# Patient Record
Sex: FEMALE | Race: WHITE | Employment: OTHER | URBAN - NONMETROPOLITAN AREA
[De-identification: names, ages, dates, MRNs, and addresses within clinical notes are randomized per-mention and may not be internally consistent; named-entity substitution may affect disease eponyms.]

---

## 2018-12-10 ENCOUNTER — HOSPITAL ENCOUNTER (OUTPATIENT)
Dept: PAIN MANAGEMENT | Age: 53
Discharge: HOME | End: 2018-12-10
Payer: COMMERCIAL

## 2018-12-10 VITALS
OXYGEN SATURATION: 94 % | BODY MASS INDEX: 44.3 KG/M2 | HEIGHT: 63 IN | TEMPERATURE: 97.7 F | SYSTOLIC BLOOD PRESSURE: 151 MMHG | HEART RATE: 89 BPM | DIASTOLIC BLOOD PRESSURE: 99 MMHG | RESPIRATION RATE: 18 BRPM | WEIGHT: 250 LBS

## 2018-12-10 DIAGNOSIS — G89.29 CHRONIC BILATERAL LOW BACK PAIN WITH BILATERAL SCIATICA: Primary | ICD-10-CM

## 2018-12-10 DIAGNOSIS — M19.90 ARTHRITIS: ICD-10-CM

## 2018-12-10 DIAGNOSIS — M54.41 CHRONIC BILATERAL LOW BACK PAIN WITH BILATERAL SCIATICA: ICD-10-CM

## 2018-12-10 DIAGNOSIS — M46.1 SACROILIITIS (HCC): ICD-10-CM

## 2018-12-10 DIAGNOSIS — M54.42 CHRONIC BILATERAL LOW BACK PAIN WITH BILATERAL SCIATICA: ICD-10-CM

## 2018-12-10 DIAGNOSIS — M54.42 CHRONIC BILATERAL LOW BACK PAIN WITH BILATERAL SCIATICA: Primary | ICD-10-CM

## 2018-12-10 DIAGNOSIS — G89.29 CHRONIC BILATERAL LOW BACK PAIN WITH BILATERAL SCIATICA: ICD-10-CM

## 2018-12-10 DIAGNOSIS — M54.41 CHRONIC BILATERAL LOW BACK PAIN WITH BILATERAL SCIATICA: Primary | ICD-10-CM

## 2018-12-10 PROCEDURE — 99204 OFFICE O/P NEW MOD 45 MIN: CPT | Performed by: NURSE PRACTITIONER

## 2018-12-10 PROCEDURE — 99204 OFFICE O/P NEW MOD 45 MIN: CPT

## 2018-12-10 RX ORDER — LISINOPRIL 5 MG/1
1 TABLET ORAL DAILY
Refills: 5 | COMMUNITY
Start: 2018-11-21 | End: 2021-02-22

## 2018-12-10 RX ORDER — TIZANIDINE 4 MG/1
TABLET ORAL
Qty: 60 TABLET | Refills: 1 | Status: SHIPPED | OUTPATIENT
Start: 2018-12-10 | End: 2020-05-26 | Stop reason: SDUPTHER

## 2018-12-10 RX ORDER — ESCITALOPRAM OXALATE 20 MG/1
1 TABLET ORAL DAILY
Refills: 5 | COMMUNITY
Start: 2018-11-21

## 2018-12-10 RX ORDER — FOLIC ACID 1 MG/1
1 TABLET ORAL DAILY
Refills: 0 | COMMUNITY
Start: 2018-11-21

## 2018-12-10 RX ORDER — ETANERCEPT 50 MG/ML
50 SOLUTION SUBCUTANEOUS WEEKLY
Refills: 0 | COMMUNITY
Start: 2018-11-21

## 2018-12-10 RX ORDER — PHENTERMINE HYDROCHLORIDE 37.5 MG/1
0.5 TABLET ORAL DAILY
Refills: 0 | COMMUNITY
Start: 2018-11-21 | End: 2019-11-19

## 2018-12-10 RX ORDER — HYDROCODONE BITARTRATE AND ACETAMINOPHEN 7.5; 325 MG/1; MG/1
1 TABLET ORAL EVERY 8 HOURS PRN
Qty: 90 TABLET | Refills: 0 | Status: SHIPPED | OUTPATIENT
Start: 2018-12-10 | End: 2019-01-07 | Stop reason: SDUPTHER

## 2018-12-10 RX ORDER — COVID-19 ANTIGEN TEST
KIT MISCELLANEOUS PRN
COMMUNITY
End: 2022-10-04

## 2018-12-10 RX ORDER — EMOLLIENT BASE
CREAM (GRAM) TOPICAL
Qty: 300 G | Refills: 5 | Status: SHIPPED | OUTPATIENT
Start: 2018-12-10 | End: 2019-02-05

## 2018-12-10 RX ORDER — OMEPRAZOLE 10 MG/1
10-20 CAPSULE, DELAYED RELEASE ORAL DAILY
COMMUNITY
End: 2020-05-26

## 2018-12-10 ASSESSMENT — PAIN DESCRIPTION - FREQUENCY: FREQUENCY: CONTINUOUS

## 2018-12-10 ASSESSMENT — PAIN DESCRIPTION - ORIENTATION: ORIENTATION: LOWER;LEFT;RIGHT

## 2018-12-10 ASSESSMENT — ENCOUNTER SYMPTOMS
BACK PAIN: 1
CONSTIPATION: 0
BOWEL INCONTINENCE: 0

## 2018-12-10 ASSESSMENT — PAIN DESCRIPTION - PAIN TYPE: TYPE: CHRONIC PAIN

## 2018-12-10 ASSESSMENT — PAIN DESCRIPTION - ONSET: ONSET: ON-GOING

## 2018-12-10 ASSESSMENT — ACTIVITIES OF DAILY LIVING (ADL): EFFECT OF PAIN ON DAILY ACTIVITIES: LIMITS ACTIVITIES

## 2018-12-10 ASSESSMENT — PAIN DESCRIPTION - PROGRESSION: CLINICAL_PROGRESSION: NOT CHANGED

## 2018-12-10 NOTE — H&P
patients care. EDU Knapp - CNP, 12/10/2018 at9:41 AM    EMR dragon/transcription disclaimer: Much of this encounter note is electronic transcription/translation of spoken language to printed tach. Electronic translation of spoken language may be erroneous, or at times, nonsensical words or phrases may be inadvertently transcribed.  Although, I have reviewed the note for such errors, some may still exist.

## 2018-12-11 ENCOUNTER — TRANSCRIBE ORDERS (OUTPATIENT)
Dept: PHYSICAL THERAPY | Facility: HOSPITAL | Age: 53
End: 2018-12-11

## 2018-12-11 DIAGNOSIS — M54.41 ACUTE BACK PAIN WITH SCIATICA, RIGHT: ICD-10-CM

## 2018-12-11 DIAGNOSIS — M54.12 BRACHIAL NEURITIS: Primary | ICD-10-CM

## 2018-12-19 ENCOUNTER — HOSPITAL ENCOUNTER (OUTPATIENT)
Dept: PHYSICAL THERAPY | Facility: HOSPITAL | Age: 53
Setting detail: THERAPIES SERIES
Discharge: HOME | End: 2018-12-19

## 2018-12-19 DIAGNOSIS — M54.41 CHRONIC BILATERAL LOW BACK PAIN WITH BILATERAL SCIATICA: Primary | ICD-10-CM

## 2018-12-19 DIAGNOSIS — G89.29 CHRONIC BILATERAL LOW BACK PAIN WITH BILATERAL SCIATICA: Primary | ICD-10-CM

## 2018-12-19 DIAGNOSIS — M54.42 CHRONIC BILATERAL LOW BACK PAIN WITH BILATERAL SCIATICA: Primary | ICD-10-CM

## 2018-12-19 PROCEDURE — 97140 MANUAL THERAPY 1/> REGIONS: CPT | Performed by: PHYSICAL THERAPIST

## 2018-12-19 PROCEDURE — 97162 PT EVAL MOD COMPLEX 30 MIN: CPT | Performed by: PHYSICAL THERAPIST

## 2018-12-20 NOTE — THERAPY EVALUATION
Outpatient Physical Therapy Ortho Initial Evaluation  [facility]     Patient Name: Emiliana Carlos  : 1965  MRN: [id number]  Today's Date: 2018      Visit Date: 2018  Attendance:  (med necessity)  Subjective Improvement: n/a  Next MD Appt: 19  Recert Date: 19    Therapy Diagnosis: LBP/L SI dysfunction          Past Medical History:   Diagnosis Date   • Acute bronchitis    • Backache    • Breast cyst    • Breast lump     left breast 4 mm mass 4 oclock benign on bx    • Chest pain    • Foot pain    • Gastroesophageal reflux disease    • Generalized anxiety disorder    • History of ectopic pregnancy    • History of measles    • History of polycystic ovaries    • History of varicella    • Inflammation of foot joint    • Muscle strain     Strain of muscle of trunk   • Neck pain    • Osteoarthritis of foot joint    • Otitis media    • Pain in limb    • Peroneal tendinitis    • Plantar fasciitis    • Tendinitis of ankle     Tendinitis AND/OR tenosynovitis of the ankle region - EDL   • Tobacco dependence syndrome    • Upper respiratory infection         Past Surgical History:   Procedure Laterality Date   • BREAST BIOPSY     • BREAST SURGERY  2013    BX BREAST PERCUT W/IMAGE 88542 (1) - Left breast ultrasound guided mammotome biopsy with 8 gauge needle and left a clip   • DENTAL PROCEDURE      [place] teeth extraction   • DIAGNOSTIC LAPAROSCOPY  1994    With exploratory laparotomy and right uterine cornual resection.   • HERNIA REPAIR  2008    Ventral hernia repair with mesh   • OTHER SURGICAL HISTORY  2005    PHLEB VEINS - EXTREM - (TO 83) 76213 (1) -  High ligation and complete stripping of two separate saphenous systems of the right lower extermity. Multiple puncture phlebectomies of varicose veins of right lower extremity   • OTHER SURGICAL HISTORY  2015    WALKING/AMBULATORY SHORTLEG CAST 93863 (2)   • PAP SMEAR  2010    Negative   • TOTAL  "ABDOMINAL HYSTERECTOMY WITH SALPINGO OOPHORECTOMY  11/13/2006    With right eqtcqlyh-[id number]-wqshdupsewf       Current Outpatient Medications:   •  conjugated estrogens (PREMARIN) 0.625 MG/GM vaginal cream, Insert  into the vagina daily. Insert 0.5 g twice weekly, Disp: 30 g, Rfl: 2  •  escitalopram (LEXAPRO) 10 MG tablet, Take 10 mg by mouth daily., Disp: , Rfl:   •  Etanercept (ENBREL) 25 MG reconstituted solution, Inject  under the skin., Disp: , Rfl:   •  folic acid (FOLVITE) 1 MG tablet, Take 1 mg by mouth daily., Disp: , Rfl:   •  gabapentin (NEURONTIN) 100 MG capsule, Take 100 mg by mouth 3 (three) times a day., Disp: , Rfl:   •  HYDROcodone-acetaminophen (NORCO)  MG per tablet, Take 1 tablet by mouth every 6 (six) hours as needed for moderate pain (4-6)., Disp: , Rfl:   •  methotrexate 2.5 MG tablet, Take  by mouth 3 (three) doses each week. Take doses 12 (twelve) hours apart from each other., Disp: , Rfl:     Allergies   Allergen Reactions   • Codeine        Visit Dx:     ICD-10-CM ICD-9-CM   1. Chronic bilateral low back pain with bilateral sciatica M54.42 724.2    M54.41 724.3    G89.29 338.29       Patient History     Row Name 12/19/18 0900          Chief Complaint  Pain  -SS    Type of Pain  Back pain;Lower Extremity / Leg  -SS    Date Current Problem(s) Began  -- chronic  -SS    Brief Description of Current Complaint  Patient has a chronic history of low back pain that radiates typically down the posterolateral L thigh to the knee and occasionally in R LE. She thinks that some of her back pain is related to L ankle arthritis. \"Because when it flares, my back hurts worse.\" Numbness R lateral thigh. Unknown cause of onset. Increased pain with prolonged standing, lifting, repetitive bending, and sitting > 1 hour. Decreased pain with Epsom salt soaks, position changes.  female with children. Lives in a single story house with 2 steps to enter. Difficulty with stairs due to ankle and low back.  " "-SS    Patient/Caregiver Goals  Relieve pain  -SS    Current Tobacco Use  cigarette smoker  -SS    Smoking Status  1 ppd  -SS    Patient's Rating of General Health  Good  -SS    Occupation/sports/leisure activities  [employer] - , has left work early due to pain. Hobbies: grandchild  -SS    What clinical tests have you had for this problem?  -- none recent  -SS          Pain Location  Back;Leg  -SS    Pain at Present  4  -SS    Pain at Best  4 over past 1 month  -SS    Pain at Worst  6 over past 1 month  -SS    Pain Frequency  Constant/continuous  -SS    Pain Description  Aching;Sharp back \"hurts/bruise\", hip sharp  -SS    What Performance Factors Make the Current Problem(s) WORSE?  prolonged sitting, prolonged standing, lifting, repetitive bending  -SS    What Performance Factors Make the Current Problem(s) BETTER?  Epsom salt soak  -SS    Is your sleep disturbed?  Yes  -SS    Is medication used to assist with sleep?  No  -SS    Difficulties at work?  increased pain  -SS    Difficulties with ADL's?  increased pain  -SS    Difficulties with recreational activities?  increased pain  -SS          Any falls in the past year:  No  -SS    Does patient have a fear of falling  Yes (comment)  -SS          Primary Language  English  -SS          Are you being hurt, hit, or frightened by anyone at home or in your life?  No  -SS    Are you being neglected by a caregiver  No  -SS      User Key  (r) = Recorded By, (t) = Taken By, (c) = Cosigned By    Initials Name Provider Type    Travis Hernandez, PT DPT Physical Therapist          PT Ortho     Row Name 12/19/18 0900       Subjective Comments    Subjective Comments  see Therapy Patient History  -SS       Precautions and Contraindications    Precautions/Limitations  no known precautions/limitations  -SS       Subjective Pain    Able to rate subjective pain?  yes  -SS    Pre-Treatment Pain Level  4  -SS    Post-Treatment Pain Level  4  -SS       " Posture/Observations    Alignment Options  Forward head;Cervical lordosis;Thoracic kyphosis;Rounded shoulders;Lumbar lordosis  -SS    Forward Head  Moderate  -SS    Cervical Lordosis  Increased  -SS    Thoracic Kyphosis  Increased  -SS    Rounded Shoulders  Bilateral:  -SS    Lumbar lordosis  Increased  -SS    Posture/Observations Comments  Antalgic gait from ankle and low back.  -SS       Lumbar/SI Special Tests    Lumbar/SI Special Tests Comments  Anterior rotation L hemipelvis  -SS       Lumbosacral Palpation    SI  Left:;Tender  -SS    Lumbosacral Segment  Left:;Tender  -SS    Thoracolumbar Segment  Tender;Guarded/taut  -SS    Spinous Process  Tender thoracolumbar junction  -SS    Piriformis  Right:;Tender;Guarded/taut  -SS    Quadratus Lumborum  -- non-tender  -SS    Erector Spinae (Paraspinals)  Left:;Tender;Guarded/taut lower lumbars  -SS       Head/Neck/Trunk    Trunk Extension AROM  WFLs; pain thoracolumbar junction  -SS    Trunk Flexion AROM  hands to patella; pulling thoracolumbar junction and L SI  -SS    Trunk Lt Lateral Flexion AROM  fingertips to knee lateral joint line  -SS    Trunk Rt Lateral Flexion AROM  fingertips to superior patella; pain L thoracolumbar junction  -SS       MMT (Manual Muscle Testing)    Rt Lower Ext  Rt Hip Flexion;Rt Hip Extension;Rt Hip ABduction;Rt Hip ADduction;Rt Hip Internal (Medial) Rotation;Rt Hip External (Lateral) Rotation;Rt Knee Extension;Rt Knee Flexion;Rt Ankle Dorsiflexion  -SS    Lt Lower Ext  Lt Hip Flexion;Lt Hip Extension;Lt Hip ABduction;Lt Hip ADduction;Lt Hip Internal (Medial) Rotation;Lt Hip External (Lateral) Rotation;Lt Knee Flexion;Lt Knee Extension;Lt Ankle Dorsiflexion  -SS       MMT Right Lower Ext    Rt Hip Flexion MMT, Gross Movement  (5/5) normal  -SS    Rt Hip Extension MMT, Gross Movement  (4/5) good  -SS    Rt Hip ABduction MMT, Gross Movement  (5/5) normal  -SS    Rt Hip ADduction MMT, Gross Movement  (5/5) normal  -SS    Rt Hip Internal  (Medial) Rotation MMT, Gross Movement  (5/5) normal  -SS    Rt Hip External (Lateral) Rotation MMT, Gross Movement  (5/5) normal  -SS    Rt Knee Extension MMT, Gross Movement  (5/5) normal  -SS    Rt Knee Flexion MMT, Gross Movement  (5/5) normal  -SS    Rt Ankle Dorsiflexion MMT, Gross Movement  (5/5) normal  -SS       MMT Left Lower Ext    Lt Hip Flexion MMT, Gross Movement  (4+/5) good plus L SI pain  -SS    Lt Hip Extension MMT, Gross Movement  (4/5) good L hip/SI pain  -SS    Lt Hip ABduction MMT, Gross Movement  (4/5) good L hip/SI pain  -SS    Lt Hip ADduction MMT, Gross Movement  (4/5) good L hip/SI pain  -SS    Lt Hip Internal (Medial) Rotation MMT, Gross Movement  (4+/5) good plus L hip/SI pain  -SS    Lt Hip External (Lateral) Rotation MMT, Gross Movement  (4+/5) good plus L hip/SI pain  -SS    Lt Knee Extension MMT, Gross Movement  (5/5) normal L hip/SI pain  -SS    Lt Knee Flexion MMT, Gross Movement  (5/5) normal L hip/SI pain  -SS    Lt Ankle Dorsiflexion MMT, Gross Movement  (4-/5) good minus  -SS      User Key  (r) = Recorded By, (t) = Taken By, (c) = Cosigned By    Initials Name Provider Type    Travis Hernandez, PT DPT Physical Therapist                      Therapy Education  Education Details: trans ab set, glute set  Given: HEP  Program: New  How Provided: Verbal, Demonstration, Written  Provided to: Patient  Level of Understanding: Verbalized, Demonstrated     PT OP Goals     Row Name 12/19/18 0900          STG Date to Achieve  01/09/19  -    STG 1  Note a >/= 50% subjective improvement  -    STG 2  Modified Oswestry score to be </= 24%  -          LTG Date to Achieve  01/30/19  -    LTG 1  Independent with HEP  -    LTG 2  TROM WFLs with minimal to no pain  -    LTG 3  R LE MMT to be 5/5 in all planes  -    LTG 4  Report ability to complete work and household activities with tolerable level of pain  -          PT Goal Re-Cert Due Date  01/09/19  [facility]      User Key  (r)  = Recorded By, (t) = Taken By, (c) = Cosigned By    Initials Name Provider Type     Travis Escobedo, PT DPT Physical Therapist          PT Assessment/Plan     Row Name 12/19/18 0900          Functional Limitations  Limitation in home management;Limitations in community activities;Limitations in functional capacity and performance;Performance in leisure activities;Performance in work activities  -    Impairments  Range of motion;Pain;Muscle strength  -    Assessment Comments  Patient has chronic LBP with apparent SI dysfunction. Alignment was corrected by manual therapy this date. Patient reports that she feels looser after treatment but no change in pain this date.   -    Rehab Potential  Good barrier: chronicity  -    Patient/caregiver participated in establishment of treatment plan and goals  Yes  -SS    Patient would benefit from skilled therapy intervention  Yes  -SS          PT Frequency  2x/week  -    Predicted Duration of Therapy Intervention (Therapy Eval)  4-6 weeks  -    PT Plan Comments  Manual therapy, stretching, trunk and lower extremity muscle strengthening, IFC estim with heat/ice as needed for pain.  -      User Key  (r) = Recorded By, (t) = Taken By, (c) = Cosigned By    Initials Name Provider Type     Travis Escobedo, PT DPT Physical Therapist            Exercises     Row Name 12/19/18 0900          Subjective Comments  see Therapy Patient History  -          Able to rate subjective pain?  yes  -SS    Pre-Treatment Pain Level  4  -SS    Post-Treatment Pain Level  4  -SS          Exercise Name 1  Trans ab set  -SS    Cueing 1  Verbal  -SS    Sets 1  1  -SS    Reps 1  10  -SS    Time 1  5 sec  -SS          Exercise Name 2  Supine glute set  -SS    Cueing 2  Verbal  -SS    Sets 2  1  -SS    Reps 2  5  -SS    Time 2  5 sec  -SS      User Key  (r) = Recorded By, (t) = Taken By, (c) = Cosigned By    Initials Name Provider Type    Travis Hernandez, PT DPT  Physical Therapist           Manual Rx (last 36 hours)      Manual Treatments     Row Name 12/19/18 0900       Manual Rx 1    Manual Rx 1 Location  lumbar spine  -SS    Manual Rx 1 Type  joint mobilization  -SS    Manual Rx 1 Grade  4  -SS       Manual Rx 2    Manual Rx 2 Location  L hemipelvis  -SS    Manual Rx 2 Type  anterior rotation correction  -SS       Manual Rx 3    Manual Rx 3 Type  ME shotgun  -SS      User Key  (r) = Recorded By, (t) = Taken By, (c) = Cosigned By    Initials Name Provider Type     Travis Escobedo, PT DPT Physical Therapist                      Outcome Measure Options: Modified [place]  Modified Oswestry  Modified Oswestry Score/Comments: 17/50 = 54%      Time Calculation:         Start Time: 0935  Stop Time: 1015  Time Calculation (min): 40 min     Therapy Charges for Today     Code Description Service Date Service Provider Modifiers Qty    21956379500 HC PT MANUAL THERAPY EA 15 MIN 12/19/2018 Travis Escobedo, PT DPT GP 1    13611580900 HC PT EVAL MOD COMPLEXITY 2 12/19/2018 Travis Escobedo, PT DPT GP 1                   Travis Escobedo, PT, DPT, CHT  12/19/2018

## 2018-12-26 ENCOUNTER — APPOINTMENT (OUTPATIENT)
Dept: PHYSICAL THERAPY | Facility: HOSPITAL | Age: 53
End: 2018-12-26

## 2018-12-27 ENCOUNTER — HOSPITAL ENCOUNTER (OUTPATIENT)
Dept: PHYSICAL THERAPY | Facility: HOSPITAL | Age: 53
Setting detail: THERAPIES SERIES
Discharge: HOME | End: 2018-12-27

## 2018-12-27 DIAGNOSIS — M54.41 CHRONIC BILATERAL LOW BACK PAIN WITH BILATERAL SCIATICA: Primary | ICD-10-CM

## 2018-12-27 DIAGNOSIS — M54.42 CHRONIC BILATERAL LOW BACK PAIN WITH BILATERAL SCIATICA: Primary | ICD-10-CM

## 2018-12-27 DIAGNOSIS — G89.29 CHRONIC BILATERAL LOW BACK PAIN WITH BILATERAL SCIATICA: Primary | ICD-10-CM

## 2018-12-27 PROCEDURE — 97140 MANUAL THERAPY 1/> REGIONS: CPT | Performed by: PHYSICAL THERAPIST

## 2018-12-27 PROCEDURE — 97110 THERAPEUTIC EXERCISES: CPT | Performed by: PHYSICAL THERAPIST

## 2018-12-27 PROCEDURE — G0283 ELEC STIM OTHER THAN WOUND: HCPCS | Performed by: PHYSICAL THERAPIST

## 2018-12-27 NOTE — THERAPY TREATMENT NOTE
Outpatient Physical Therapy Ortho Treatment Note  [facility]     Patient Name: Emiliana Carlos  : 1965  MRN: [id number]  Today's Date: 2018      Visit Date: 2018  Attendance:  (med necessity)  Subjective Improvement: 0%  Next MD Appt: 19  Recert Date: 19     Therapy Diagnosis: LBP/L SI dysfunction      Visit Dx:    ICD-10-CM ICD-9-CM   1. Chronic bilateral low back pain with bilateral sciatica M54.42 724.2    M54.41 724.3    G89.29 338.29            Past Medical History:   Diagnosis Date   • Acute bronchitis    • Backache    • Breast cyst    • Breast lump     left breast 4 mm mass 4 oclock benign on bx    • Chest pain    • Foot pain    • Gastroesophageal reflux disease    • Generalized anxiety disorder    • History of ectopic pregnancy    • History of measles    • History of polycystic ovaries    • History of varicella    • Inflammation of foot joint    • Muscle strain     Strain of muscle of trunk   • Neck pain    • Osteoarthritis of foot joint    • Otitis media    • Pain in limb    • Peroneal tendinitis    • Plantar fasciitis    • Tendinitis of ankle     Tendinitis AND/OR tenosynovitis of the ankle region - EDL   • Tobacco dependence syndrome    • Upper respiratory infection         Past Surgical History:   Procedure Laterality Date   • BREAST BIOPSY     • BREAST SURGERY  2013    BX BREAST PERCUT W/IMAGE 69592 (1) - Left breast ultrasound guided mammotome biopsy with 8 gauge needle and left a clip   • DENTAL PROCEDURE      [place] teeth extraction   • DIAGNOSTIC LAPAROSCOPY  1994    With exploratory laparotomy and right uterine cornual resection.   • HERNIA REPAIR  2008    Ventral hernia repair with mesh   • OTHER SURGICAL HISTORY  2005    PHLEB VEINS - EXTREM - (TO 00) 95655 (1) -  High ligation and complete stripping of two separate saphenous systems of the right lower extermity. Multiple puncture phlebectomies of varicose veins of right lower  extremity   • OTHER SURGICAL HISTORY  03/22/2015    WALKING/AMBULATORY SHORTLEG CAST 60634 (2)   • PAP SMEAR  05/06/2010    Negative   • TOTAL ABDOMINAL HYSTERECTOMY WITH SALPINGO OOPHORECTOMY  11/13/2006    With right haxuxdjl-wslefj-[id number]       PT Ortho     Row Name 12/27/18 1500       Precautions and Contraindications    Precautions/Limitations  no known precautions/limitations  -       Posture/Observations    Posture/Observations Comments  Antalgic gait.  -SS       Lumbar/SI Special Tests    Lumbar/SI Special Tests Comments  Anterior rotation L. L LE long compared to R. No change in symptoms with manual distraction.   -      User Key  (r) = Recorded By, (t) = Taken By, (c) = Cosigned By    Initials Name Provider Type    Travis Hernandez, PT DPT Physical Therapist                      PT Assessment/Plan     Row Name 12/27/18 1500          Functional Limitations  Limitation in home management;Limitations in community activities;Limitations in functional capacity and performance;Performance in leisure activities;Performance in work activities  -    Impairments  Range of motion;Pain;Muscle strength  -    Assessment Comments  Patient was dispensed a small heel lift for correction of functional leg length deficit during gait. Gait more even with heel lift in place. Unable to correct alignment this date. Decreased pain with IFC estim and MHP.  -    Rehab Potential  Good barrier: chronicity  -    Patient/caregiver participated in establishment of treatment plan and goals  Yes  -SS    Patient would benefit from skilled therapy intervention  Yes  -SS          PT Frequency  2x/week  -    Predicted Duration of Therapy Intervention (Therapy Eval)  4-6 weeks  -    PT Plan Comments  Continue POC. Progress core stabilization exercises next.  -      User Key  (r) = Recorded By, (t) = Taken By, (c) = Cosigned By    Initials Name Provider Type    Travis Hernandez, PT DPT Physical Therapist           Modalities     Row Name 12/27/18 1500          MH Applied  Yes  -SS    Location  low back concurrent with IFC estim  -SS    Rx Minutes  15 mins  -SS    MH Prior to Rx  No  -SS    MH S/P Rx  Yes  -SS          Attended/Unattended  Unattended  -SS    Stimulation Type  IFC  -SS    Location/Electrode Placement/Other  low back concurrent with MHP  -SS     PT E-Stim Unattended (Manual) Minutes  15  -SS      User Key  (r) = Recorded By, (t) = Taken By, (c) = Cosigned By    Initials Name Provider Type    SS Travis Escobedo, PT DPT Physical Therapist          Exercises     Row Name 12/27/18 1500          Existing Precautions/Restrictions  no known precautions/restrictions  -SS          Subjective Comments  Felt a bit better after evaluation until the next day. 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 Denies numbness and tingling. HEP 1-2x/day. 0% improvement.  -SS          Able to rate subjective pain?  yes  -SS    Pre-Treatment Pain Level  5  -SS    Post-Treatment Pain Level  4  -SS          Exercise Name 1  manual therapy  -SS          Exercise Name 2  Prone knee flexion  -SS    Cueing 2  Verbal  -SS    Sets 2  1  -SS    Reps 2  10  -SS          Exercise Name 3  Prone glute sets  -SS    Cueing 3  Verbal  -SS    Sets 3  1  -SS    Reps 3  15  -SS    Time 3  5 sec hold  -SS          Exercise Name 4  Prone SLR extension  -SS    Cueing 4  Verbal  -SS    Sets 4  2  -SS    Reps 4  5  -SS    Time 4  2 sec hold  -SS          Exercise Name 5  SKTC L  -SS    Cueing 5  Verbal  -SS    Sets 5  3  -SS    Time 5  30 sec hold  -SS          Exercise Name 6  supine L piriformis stretch  -SS    Cueing 6  Verbal  -SS    Sets 6  3  -SS    Time 6  30 sec hold  -SS          Exercise Name 7  Seated trunk rotation   -SS    Cueing 7  Verbal;Demo  -SS    Sets 7  1  -SS    Reps 7  5  -SS    Time 7  5 sec hold  -SS          Exercise Name 8  Seated iso hip adduction  -SS    Cueing 8  Verbal;Demo  -SS    Sets  8  1  -SS    Reps 8  10  -SS    Time 8  5 sec hold  -      User Key  (r) = Recorded By, (t) = Taken By, (c) = Cosigned By    Initials Name Provider Type    Travis Hernandez, PT DPT Physical Therapist                        Manual Rx (last 36 hours)      Manual Treatments     Row Name 12/27/18 1500          Manual Rx 1 Location  L hemipelvis  -SS    Manual Rx 1 Type  anterior rotation correction  -SS          Manual Rx 2 Type  ME shotgun  -SS          Manual Rx 3 Location  lumbar spine  -SS    Manual Rx 3 Type  joint mobilization  -SS    Manual Rx 3 Grade  4  -SS          Manual Rx 4 Type  Sacral mobilization  -SS    Manual Rx 4 Grade  3  -SS      User Key  (r) = Recorded By, (t) = Taken By, (c) = Cosigned By    Initials Name Provider Type    Travis Hernandez, PT DPT Physical Therapist          PT OP Goals     Row Name 12/27/18 1500          STG Date to Achieve  01/09/19  -    STG 1  Note a >/= 50% subjective improvement  -    STG 1 Progress  Ongoing  -    STG 2  Modified Oswestry score to be </= 24%  -    STG 2 Progress  Ongoing  -          LTG Date to Achieve  01/30/19  -    LTG 1  Independent with HEP  -    LTG 1 Progress  Ongoing  -    LTG 2  TROM WFLs with minimal to no pain  -    LTG 2 Progress  Ongoing  -    LTG 3  R LE MMT to be 5/5 in all planes  -    LTG 3 Progress  Ongoing  -    LTG 4  Report ability to complete work and household activities with tolerable level of pain  -    LTG 4 Progress  Ongoing  -          PT Goal Re-Cert Due Date  01/09/19  -      User Key  (r) = Recorded By, (t) = Taken By, (c) = Cosigned By    Initials Name Provider Type    Travis Hernandez, PT DPT Physical Therapist          Therapy Education  Education Details: supine L piriformis stretch  Given: HEP  Program: Progressed  How Provided: Verbal, Written  Provided to: Patient  Level of Understanding: Verbalized, Demonstrated              Time Calculation:   Start Time:  1512  Stop Time: 1609  Time Calculation (min): 57 min    Therapy Charges for Today     Code Description Service Date Service Provider Modifiers Qty    70537841233 HC PT THER PROC EA 15 MIN 12/27/2018 Travis Escobedo, PT DPT GP 2    56536873571 HC PT MANUAL THERAPY EA 15 MIN 12/27/2018 Travis Escobedo, PT DPT GP 1    34759535640 HC PT ELECTRICAL STIM UNATTENDED 12/27/2018 Travis Escobedo, PT DPT  1    33504401750 HC PT THER SUPP EA 15 MIN 12/27/2018 Travis Escobedo, PT DPT GP 1                    Travis Escobedo, PT, DPT, CHT  12/27/2018

## 2019-01-02 ENCOUNTER — HOSPITAL ENCOUNTER (OUTPATIENT)
Dept: PHYSICAL THERAPY | Facility: HOSPITAL | Age: 54
Setting detail: THERAPIES SERIES
Discharge: HOME | End: 2019-01-02

## 2019-01-02 DIAGNOSIS — G89.29 CHRONIC BILATERAL LOW BACK PAIN WITH BILATERAL SCIATICA: Primary | ICD-10-CM

## 2019-01-02 DIAGNOSIS — M54.42 CHRONIC BILATERAL LOW BACK PAIN WITH BILATERAL SCIATICA: Primary | ICD-10-CM

## 2019-01-02 DIAGNOSIS — M54.41 CHRONIC BILATERAL LOW BACK PAIN WITH BILATERAL SCIATICA: Primary | ICD-10-CM

## 2019-01-02 PROCEDURE — G0283 ELEC STIM OTHER THAN WOUND: HCPCS

## 2019-01-02 PROCEDURE — 97110 THERAPEUTIC EXERCISES: CPT

## 2019-01-02 PROCEDURE — 97140 MANUAL THERAPY 1/> REGIONS: CPT

## 2019-01-02 NOTE — THERAPY TREATMENT NOTE
Outpatient Physical Therapy Ortho Treatment Note  [facility]     Patient Name: Emiliana Carlos  : 1965  MRN: [id number]  Today's Date: 2019      Visit Date: 2019     Subjective Improvement 0  Visits 3/4  Visits approved med nec  RTMD 2019  recert Date 2019    Low back pain and SI dysfuncton    Visit Dx:    ICD-10-CM ICD-9-CM   1. Chronic bilateral low back pain with bilateral sciatica M54.42 724.2    M54.41 724.3    G89.29 338.29       There is no problem list on file for this patient.       Past Medical History:   Diagnosis Date   • Acute bronchitis    • Backache    • Breast cyst    • Breast lump     left breast 4 mm mass 4 oclock benign on bx    • Chest pain    • Foot pain    • Gastroesophageal reflux disease    • Generalized anxiety disorder    • History of ectopic pregnancy    • History of measles    • History of polycystic ovaries    • History of varicella    • Inflammation of foot joint    • Muscle strain     Strain of muscle of trunk   • Neck pain    • Osteoarthritis of foot joint    • Otitis media    • Pain in limb    • Peroneal tendinitis    • Plantar fasciitis    • Tendinitis of ankle     Tendinitis AND/OR tenosynovitis of the ankle region - EDL   • Tobacco dependence syndrome    • Upper respiratory infection         Past Surgical History:   Procedure Laterality Date   • BREAST BIOPSY     • BREAST SURGERY  2013    BX BREAST PERCUT W/IMAGE 03560 (1) - Left breast ultrasound guided mammotome biopsy with 8 gauge needle and left a clip   • DENTAL PROCEDURE      [place] teeth extraction   • DIAGNOSTIC LAPAROSCOPY  1994    With exploratory laparotomy and right uterine cornual resection.   • HERNIA REPAIR  2008    Ventral hernia repair with mesh   • OTHER SURGICAL HISTORY  2005    PHLEB VEINS - EXTREM - (TO 87) 08896 (1) -  High ligation and complete stripping of two separate saphenous systems of the right lower extermity. Multiple puncture  phlebectomies of varicose veins of right lower extremity   • OTHER SURGICAL HISTORY  03/22/2015    WALKING/AMBULATORY SHORTLEG CAST 60187 (2)   • PAP SMEAR  05/06/2010    Negative   • TOTAL ABDOMINAL HYSTERECTOMY WITH SALPINGO OOPHORECTOMY  11/13/2006    With right byssgecx-vvulfu-ktvnquxkplf                       PT Assessment/Plan     Row Name 01/02/19 1109          PT Assessment    Assessment Comments  Attempted ME to correct ant rotation.  ME did not 100% correct rotation.  No change in pain after manual  -CP        PT Plan    PT Frequency  2x/week  -CP     Predicted Duration of Therapy Intervention (Therapy Eval)  4-6 weeks.  Patien only able to attend therapy 1x week secondary to work schedule  -CP     PT Plan Comments  Cont with POC.  Mini squats 3 way.  Contact primary PT on starting aquatics .  -CP       User Key  (r) = Recorded By, (t) = Taken By, (c) = Cosigned By    Initials Name Provider Type    CP Aarti Maurice, MARTHA Physical Therapy Assistant          Modalities     Row Name 01/02/19 1000             Moist Heat    MH Applied  Yes  -CP      Location  low back seated  -CP      Rx Minutes  -- 20 minutes with IFC  -CP      MH S/P Rx  Yes  -CP         ELECTRICAL STIMULATION    Attended/Unattended  Unattended  -CP      Stimulation Type  IFC  -CP      Location/Electrode Placement/Other  low back  -CP       PT E-Stim Unattended (Manual) Minutes  20  -CP        User Key  (r) = Recorded By, (t) = Taken By, (c) = Cosigned By    Initials Name Provider Type    CP Aarti Maurice, PTA Physical Therapy Assistant          Exercises     Row Name 01/02/19 1000             Subjective Comments    Subjective Comments  Patient states that she feels about the same.  She did some heavy house cleaning yesterday and feels stiff and sore..  States that she is wearing her heel lift at times  -CP         Subjective Pain    Able to rate subjective pain?  yes  -CP      Pre-Treatment Pain Level  5  -CP      Post-Treatment  "Pain Level  5  -CP         Exercise 1    Exercise Name 1  incline stretch  -CP      Sets 1  3  -CP      Time 1  30  -CP         Exercise 2    Exercise Name 2  Standing HS stretch  -CP      Sets 2  3  -CP      Time 2  30  -CP      Additional Comments  bilateral  -CP         Exercise 3    Exercise Name 3  seated mid back stretch with tball  -CP      Sets 3  3  -CP      Time 3  20  -CP         Exercise 4    Exercise Name 4  Pro II level 1  -CP      Time 4  10  -CP      Additional Comments  LE  -CP         Exercise 5    Exercise Name 5  dktc with tball  -CP      Reps 5  30  -CP         Exercise 6    Exercise Name 6  LTR stretch  -CP      Reps 6  30  -CP         Exercise 7    Exercise Name 7  bridges  -CP      Sets 7  2  -CP      Reps 7  10  -CP         Exercise 8    Exercise Name 8  alignment check  -CP         Exercise 9    Exercise Name 9  see manual  -CP         Exercise 10    Exercise Name 10  supine TA  -CP      Sets 10  2  -CP      Reps 10  10  -CP      Time 10  5\" hold  -CP        User Key  (r) = Recorded By, (t) = Taken By, (c) = Cosigned By    Initials Name Provider Type    CP Aarti Maurice, MARTHA Physical Therapy Assistant                        Manual Rx (last 36 hours)      Manual Treatments     Row Name 01/02/19 1100             Manual Rx 1    Manual Rx 1 Location  Left SI  -CP      Manual Rx 1 Type  ME for ant rotaton  -CP      Manual Rx 1 Duration  5  -CP         Manual Rx 2    Manual Rx 2 Type  ME shotgun negetitve  -CP         Manual Rx 3    Manual Rx 3 Location  sacral  -CP      Manual Rx 3 Type  sacral mob and rocking  -CP      Manual Rx 3 Duration  5  -CP        User Key  (r) = Recorded By, (t) = Taken By, (c) = Cosigned By    Initials Name Provider Type    CP Aarti Maurice, MARTHA Physical Therapy Assistant          PT OP Goals     Row Name 01/02/19 1100          PT Short Term Goals    STG Date to Achieve  01/09/19  -CP     STG 1  Note a >/= 50% subjective improvement  -CP     STG 1 Progress  " Ongoing  -CP     STG 2  Modified Oswestry score to be </= 24%  -CP     STG 2 Progress  Ongoing  -CP        Long Term Goals    LTG Date to Achieve  01/30/19  -CP     LTG 1  Independent with HEP  -CP     LTG 1 Progress  Ongoing  -CP     LTG 2  TROM WFLs with minimal to no pain  -CP     LTG 2 Progress  Ongoing  -CP     LTG 3  R LE MMT to be 5/5 in all planes  -CP     LTG 3 Progress  Ongoing  -CP     LTG 4  Report ability to complete work and household activities with tolerable level of pain  -CP     LTG 4 Progress  Ongoing  -CP        Time Calculation    PT Goal Re-Cert Due Date  01/09/19  -CP       User Key  (r) = Recorded By, (t) = Taken By, (c) = Cosigned By    Initials Name Provider Type    Aarti Nelson, PTA Physical Therapy Assistant                         Time Calculation:   Start Time: 1020  Stop Time: 1127  Time Calculation (min): 67 min  Total Timed Code Minutes- PT: 45 minute(s)  Therapy Suggested Charges     Code   Minutes Charges    98283 (CPT®) Hc Pt Neuromusc Re Education Ea 15 Min      19001 (CPT®) Hc Pt Ther Proc Ea 15 Min      45307 (CPT®) Hc Gait Training Ea 15 Min      33821 (CPT®) Hc Pt Therapeutic Act Ea 15 Min      95841 (CPT®) Hc Pt Manual Therapy Ea 15 Min      57628 (CPT®) Hc Pt Ther Massage- Per 15 Min      00412 (CPT®) Hc Pt Iontophoresis Ea 15 Min      40649 (CPT®) Hc Pt Elec Stim Ea-Per 15 Min      27114 (CPT®) Hc Pt Ultrasound Ea 15 Min      83807 (CPT®) Hc Pt Self Care/Mgmt/Train Ea 15 Min      51313 (CPT®) Hc Pt Prosthetic (S) Train Initial Encounter, Each 15 Min      77263 (CPT®) Hc Orthotic(S) Mgmt/Train Initial Encounter, Each 15min      48341 (CPT®) Hc Pt Aquatic Therapy Ea 15 Min      91446 (CPT®) Hc Pt Orthotic(S)/Prosthetic(S) Encounter, Each 15 Min       (CPT®) Hc Pt Electrical Stim Unattended 20 1    Total  20 1        Therapy Charges for Today     Code Description Service Date Service Provider Modifiers Qty    43803775229 HC PT MANUAL THERAPY EA 15 MIN 1/2/2019  Aarti Maurice, PTA GP 1    13357699778 HC PT THER PROC EA 15 MIN 1/2/2019 Aarti Maurice, MARTHA GP 2    37518932876 HC PT ELECTRICAL STIM UNATTENDED 1/2/2019 Aarti Maurice, PTA  1                    Aarti Maurice, MARTHA  1/2/2019

## 2019-01-07 DIAGNOSIS — G89.29 CHRONIC BILATERAL LOW BACK PAIN WITH BILATERAL SCIATICA: ICD-10-CM

## 2019-01-07 DIAGNOSIS — M54.41 CHRONIC BILATERAL LOW BACK PAIN WITH BILATERAL SCIATICA: ICD-10-CM

## 2019-01-07 DIAGNOSIS — M54.42 CHRONIC BILATERAL LOW BACK PAIN WITH BILATERAL SCIATICA: ICD-10-CM

## 2019-01-07 RX ORDER — HYDROCODONE BITARTRATE AND ACETAMINOPHEN 7.5; 325 MG/1; MG/1
1 TABLET ORAL EVERY 8 HOURS PRN
Qty: 90 TABLET | Refills: 0 | Status: SHIPPED | OUTPATIENT
Start: 2019-01-10 | End: 2019-02-05 | Stop reason: SDUPTHER

## 2019-01-10 ENCOUNTER — APPOINTMENT (OUTPATIENT)
Dept: PHYSICAL THERAPY | Facility: HOSPITAL | Age: 54
End: 2019-01-10

## 2019-01-15 ENCOUNTER — HOSPITAL ENCOUNTER (OUTPATIENT)
Dept: PHYSICAL THERAPY | Facility: HOSPITAL | Age: 54
Setting detail: THERAPIES SERIES
Discharge: HOME | End: 2019-01-15

## 2019-01-15 DIAGNOSIS — M54.42 CHRONIC BILATERAL LOW BACK PAIN WITH BILATERAL SCIATICA: Primary | ICD-10-CM

## 2019-01-15 DIAGNOSIS — M54.41 CHRONIC BILATERAL LOW BACK PAIN WITH BILATERAL SCIATICA: Primary | ICD-10-CM

## 2019-01-15 DIAGNOSIS — G89.29 CHRONIC BILATERAL LOW BACK PAIN WITH BILATERAL SCIATICA: Primary | ICD-10-CM

## 2019-01-15 PROCEDURE — 97164 PT RE-EVAL EST PLAN CARE: CPT | Performed by: PHYSICAL THERAPIST

## 2019-01-15 NOTE — THERAPY RE-EVALUATION
Outpatient Physical Therapy Ortho Re-Evaluation  [facility]     Patient Name: Emiliana Carlos  : 1965  MRN: [id number]  Today's Date: 1/15/2019      Visit Date: 01/15/2019  Attendance:  (medical necessity)  Subjective Improvement: 0%  Next MD Appt:  or 19  Recert Date: 19    Therapy Diagnosis: LBP/SI dysfunction         Past Medical History:   Diagnosis Date   • Acute bronchitis    • Backache    • Breast cyst    • Breast lump     left breast 4 mm mass 4 oclock benign on bx    • Chest pain    • Foot pain    • Gastroesophageal reflux disease    • Generalized anxiety disorder    • History of ectopic pregnancy    • History of measles    • History of polycystic ovaries    • History of varicella    • Inflammation of foot joint    • Muscle strain     Strain of muscle of trunk   • Neck pain    • Osteoarthritis of foot joint    • Otitis media    • Pain in limb    • Peroneal tendinitis    • Plantar fasciitis    • Psoriasis    • Rheumatoid arthritis (CMS/HCC)    • Tendinitis of ankle     Tendinitis AND/OR tenosynovitis of the ankle region - EDL   • Tobacco dependence syndrome    • Upper respiratory infection         Past Surgical History:   Procedure Laterality Date   • BREAST BIOPSY     • BREAST SURGERY  2013    BX BREAST PERCUT W/IMAGE 66857 (1) - Left breast ultrasound guided mammotome biopsy with 8 gauge needle and left a clip   • DENTAL PROCEDURE      [place] teeth extraction   • DIAGNOSTIC LAPAROSCOPY  1994    With exploratory laparotomy and right uterine cornual resection.   • HERNIA REPAIR  2008    Ventral hernia repair with mesh   • OTHER SURGICAL HISTORY  2005    PHLEB VEINS - EXTREM - (TO 21) 58247 (1) -  High ligation and complete stripping of two separate saphenous systems of the right lower extermity. Multiple puncture phlebectomies of varicose veins of right lower extremity   • OTHER SURGICAL HISTORY  2015    WALKING/AMBULATORY SHORTLEG CAST 05242  (2)   • PAP SMEAR  05/06/2010    Negative   • TOTAL ABDOMINAL HYSTERECTOMY WITH SALPINGO OOPHORECTOMY  11/13/2006    With right [id number]       Visit Dx:     ICD-10-CM ICD-9-CM   1. Chronic bilateral low back pain with bilateral sciatica M54.42 724.2    M54.41 724.3    G89.29 338.29           PT Ortho     Row Name 01/15/19 1500       Subjective Comments    Subjective Comments  Psoriasis outbreak on her hands for the past week. Back pain is more aggravated because drove to [place] and back today. She reports that her rheumatologist tells her that it is her rheumatoid arthritis when she complains of joint pain. 0% subjective improvement. She has noticed no change in her symptoms with therapy. Working on HEP. No medication changes.  -SS       Precautions and Contraindications    Precautions  rheumatoid arthritis, psoriasis  -SS       Subjective Pain    Able to rate subjective pain?  yes  -SS    Pre-Treatment Pain Level  -- 4.5-5  -SS    Post-Treatment Pain Level  5  -SS       Posture/Observations    Forward Head  Moderate  -SS    Cervical Lordosis  Increased  -SS    Thoracic Kyphosis  Increased  -SS    Rounded Shoulders  Bilateral:  -SS    Lumbar lordosis  Increased  -SS    Posture/Observations Comments  Antalgic gait from ankle and low back.  -SS       Lumbar/SI Special Tests    Lumbar/SI Special Tests Comments  No change with manual lumbar distraction.  -SS       Lumbosacral Palpation    SI  Left:;Tender  -SS    Lumbosacral Segment  Left:;Tender  -SS    Thoracolumbar Segment  Left:;Tender;Guarded/taut  -SS    Quadratus Lumborum  -- non-tender this date  -SS    Erector Spinae (Paraspinals)  Left:;Tender;Guarded/taut lower thoracic and lumbar paraspinals  -SS       Head/Neck/Trunk    Trunk Extension AROM  WFLs; pain L SI  -SS    Trunk Flexion AROM  hands to patella; pain thoracolumbar junction  -SS    Trunk Lt Lateral Flexion AROM  fingertips to superior patella; pain L LB and thoracolumbar  junction  -SS    Trunk Rt Lateral Flexion AROM  fingertips to superior patella; pain thoracolumbar junction  -SS       MMT Right Lower Ext    Rt Hip Flexion MMT, Gross Movement  (5/5) normal  -SS    Rt Hip Extension MMT, Gross Movement  (5/5) normal LBP  -SS    Rt Hip ABduction MMT, Gross Movement  (5/5) normal LBP  -SS    Rt Hip ADduction MMT, Gross Movement  (5/5) normal  -SS    Rt Knee Extension MMT, Gross Movement  (5/5) normal  -SS    Rt Knee Flexion MMT, Gross Movement  (5/5) normal  -SS    Rt Ankle Dorsiflexion MMT, Gross Movement  (5/5) normal  -SS       MMT Left Lower Ext    Lt Hip Flexion MMT, Gross Movement  (5/5) normal  -SS    Lt Hip Extension MMT, Gross Movement  (4/5) good  -SS    Lt Hip ABduction MMT, Gross Movement  (4+/5) good plus cogwheeling  -SS    Lt Hip ADduction MMT, Gross Movement  (5/5) normal  -SS    Lt Knee Extension MMT, Gross Movement  (5/5) normal  -SS    Lt Knee Flexion MMT, Gross Movement  (5/5) normal  -SS    Lt Ankle Dorsiflexion MMT, Gross Movement  (5/5) normal  -SS      User Key  (r) = Recorded By, (t) = Taken By, (c) = Cosigned By    Initials Name Provider Type    Travis Hernandez, CATERINA DPT Physical Therapist                      Therapy Education  Education Details: DKTC; discussion of progress and recommendation for aquatic therex  Given: HEP, Other (comment)  How Provided: Verbal  Provided to: Patient  Level of Understanding: Verbalized     PT OP Goals     Row Name 01/15/19 1500          STG Date to Achieve  02/05/19  -    STG 1  Note a >/= 50% subjective improvement  -    STG 1 Progress  Ongoing;Not Met  -    STG 2  Modified Oswestry score to be </= 24%  -    STG 2 Progress  Ongoing;Not Met  -          LTG Date to Achieve  -- TBA  -    LTG 1  Independent with HEP  -    LTG 1 Progress  Ongoing;Not Met  -    LTG 2  TROM WFLs with minimal to no pain  -    LTG 2 Progress  Ongoing;Not Met  -    LTG 3  R LE MMT to be 5/5 in all planes  -    LTG 3  Progress  Ongoing;Not Met  -    LTG 4  Report ability to complete work and household activities with tolerable level of pain  -    LTG 4 Progress  Ongoing;Not Met  -          PT Goal Re-Cert Due Date  02/05/19  -      User Key  (r) = Recorded By, (t) = Taken By, (c) = Cosigned By    Initials Name Provider Type    Travis Hernandez, PT DPT Physical Therapist          PT Assessment/Plan     Row Name 01/15/19 1500          Functional Limitations  Limitation in home management;Limitations in community activities;Limitations in functional capacity and performance;Performance in leisure activities;Performance in work activities  -    Impairments  Range of motion;Pain;Muscle strength  -    Assessment Comments  Patient is not improving at this time. We are partly limited by her work schedule. I discussed attempting aquatic therapy to try for strengthening. Patient is agreeable at this time.  -    Rehab Potential  Fair barrier: chronicity, possible rheumatoid/psoriatic arthritis  -    Patient/caregiver participated in establishment of treatment plan and goals  Yes  -SS    Patient would benefit from skilled therapy intervention  Yes  -SS          PT Frequency  1x/week  -    Predicted Duration of Therapy Intervention (Therapy Eval)  3-4 weeks with further TBA  -    PT Plan Comments  Attempt aquatic therapy for strengthening and endurance activities. Patient only able to attend 1/wk due to work schedule.  -      User Key  (r) = Recorded By, (t) = Taken By, (c) = Cosigned By    Initials Name Provider Type    Travis Hernandez, PT DPT Physical Therapist                              Outcome Measure Options: Modified [place]  Modified Oswestry  Modified Oswestry Score/Comments: 19/50 = 38%      Time Calculation:         Start Time: 1515  Stop Time: 1540  Time Calculation (min): 25 min     Therapy Charges for Today     Code Description Service Date Service Provider Modifiers Qty    01277365794  HC PT AQUA RE -EVAL  ESTABLISHED PLAN 2 1/15/2019 Travis Escobedo, PT DPT GP 1                   Travis Escobedo, PT, DPT, CHT  1/15/2019

## 2019-01-23 ENCOUNTER — HOSPITAL ENCOUNTER (OUTPATIENT)
Dept: PHYSICAL THERAPY | Facility: HOSPITAL | Age: 54
Setting detail: THERAPIES SERIES
Discharge: HOME | End: 2019-01-23

## 2019-01-23 DIAGNOSIS — M54.42 CHRONIC BILATERAL LOW BACK PAIN WITH BILATERAL SCIATICA: Primary | ICD-10-CM

## 2019-01-23 DIAGNOSIS — M54.41 CHRONIC BILATERAL LOW BACK PAIN WITH BILATERAL SCIATICA: Primary | ICD-10-CM

## 2019-01-23 DIAGNOSIS — G89.29 CHRONIC BILATERAL LOW BACK PAIN WITH BILATERAL SCIATICA: Primary | ICD-10-CM

## 2019-01-23 PROCEDURE — 97110 THERAPEUTIC EXERCISES: CPT

## 2019-01-23 NOTE — THERAPY TREATMENT NOTE
Outpatient Physical Therapy Ortho Treatment Note  [facility]     Patient Name: Emiliana Carlos  : 1965  MRN: [id number]  Today's Date: 2019      Visit Date: 2019     Subjective Improvement 0  Visits 5/7  Visits approved med nec  RTMD 2019  Recert Date 2019    Low Back pain and SI Dysfunction    Visit Dx:    ICD-10-CM ICD-9-CM   1. Chronic bilateral low back pain with bilateral sciatica M54.42 724.2    M54.41 724.3    G89.29 338.29       There is no problem list on file for this patient.       Past Medical History:   Diagnosis Date   • Acute bronchitis    • Backache    • Breast cyst    • Breast lump     left breast 4 mm mass 4 oclock benign on bx    • Chest pain    • Foot pain    • Gastroesophageal reflux disease    • Generalized anxiety disorder    • History of ectopic pregnancy    • History of measles    • History of polycystic ovaries    • History of varicella    • Inflammation of foot joint    • Muscle strain     Strain of muscle of trunk   • Neck pain    • Osteoarthritis of foot joint    • Otitis media    • Pain in limb    • Peroneal tendinitis    • Plantar fasciitis    • Psoriasis    • Rheumatoid arthritis (CMS/HCC)    • Tendinitis of ankle     Tendinitis AND/OR tenosynovitis of the ankle region - EDL   • Tobacco dependence syndrome    • Upper respiratory infection         Past Surgical History:   Procedure Laterality Date   • BREAST BIOPSY     • BREAST SURGERY  2013    BX BREAST PERCUT W/IMAGE 65197 (1) - Left breast ultrasound guided mammotome biopsy with 8 gauge needle and left a clip   • DENTAL PROCEDURE      [place] teeth extraction   • DIAGNOSTIC LAPAROSCOPY  1994    With exploratory laparotomy and right uterine cornual resection.   • HERNIA REPAIR  2008    Ventral hernia repair with mesh   • OTHER SURGICAL HISTORY  2005    PHLEB VEINS - EXTREM - TO 44) 73676 (1) -  High ligation and complete stripping of two separate saphenous systems of  the right lower extermity. Multiple puncture phlebectomies of varicose veins of right lower extremity   • OTHER SURGICAL HISTORY  03/22/2015    WALKING/AMBULATORY SHORTLEG CAST 47203 (2)   • PAP SMEAR  05/06/2010    Negative   • TOTAL ABDOMINAL HYSTERECTOMY WITH SALPINGO OOPHORECTOMY  11/13/2006    With right oeghbmiu-fbcopw-dvbmfhyysyw                       PT Assessment/Plan     Row Name 01/23/19 1203          PT Assessment    Assessment Comments  Very slight decrease in pain after therapy.  -CP        PT Plan    PT Frequency  1x/week  -CP     Predicted Duration of Therapy Intervention (Therapy Eval)  3-4 weeks  -CP     PT Plan Comments  Cont with aquatics and progress as tolerated  -CP       User Key  (r) = Recorded By, (t) = Taken By, (c) = Cosigned By    Initials Name Provider Type    Aarti Nelson PTA Physical Therapy Assistant              Exercises     Row Name 01/23/19 1100             Subjective Comments    Subjective Comments  Patient states that her pain does remain the same.  -CP         Subjective Pain    Able to rate subjective pain?  yes  -CP      Pre-Treatment Pain Level  5  -CP      Post-Treatment Pain Level  4  -CP         Aquatics    Aquatics performed?  Yes  -CP         Exercise 1    Exercise Name 1  aqu walk 3 way  -CP      Time 1  5  -CP         Exercise 2    Exercise Name 2  aqu cr/tr  -CP      Sets 2  2  -CP      Reps 2  10  -CP         Exercise 3    Exercise Name 3  aqu mini squats  -CP      Reps 3  20  -CP         Exercise 4    Exercise Name 4  aqu trunk rotation with cane   -CP      Reps 4  20  -CP         Exercise 5    Exercise Name 5  aqu paddle wheel   -CP      Sets 5  2  -CP      Reps 5  10  -CP      Time 5  CW/CCW  -CP         Exercise 6    Exercise Name 6  aqu hip 3 way  -CP      Reps 6  10  -CP      Time 6  bilateral  -CP         Exercise 7    Exercise Name 7  aqu push pull with TA  -CP      Sets 7  2  -CP      Reps 7  10  -CP      Time 7  large cookie  -CP         Exercise  8    Exercise Name 8  aqu deep hang  -CP      Time 8  8  -CP        User Key  (r) = Recorded By, (t) = Taken By, (c) = Cosigned By    Initials Name Provider Type    CP Aarti Maurice, MARTHA Physical Therapy Assistant                         PT OP Goals     Row Name 01/23/19 1200          PT Short Term Goals    STG Date to Achieve  02/05/19  -CP     STG 1  Note a >/= 50% subjective improvement  -CP     STG 1 Progress  Ongoing;Not Met  -CP     STG 2  Modified Oswestry score to be </= 24%  -CP     STG 2 Progress  Ongoing;Not Met  -CP        Long Term Goals    LTG Date to Achieve  -- TBA  -CP     LTG 1  Independent with HEP  -CP     LTG 1 Progress  Ongoing;Not Met  -CP     LTG 2  TROM WFLs with minimal to no pain  -CP     LTG 2 Progress  Ongoing;Not Met  -CP     LTG 3  R LE MMT to be 5/5 in all planes  -CP     LTG 3 Progress  Ongoing;Not Met  -CP     LTG 4  Report ability to complete work and household activities with tolerable level of pain  -CP     LTG 4 Progress  Ongoing;Not Met  -CP        Time Calculation    PT Goal Re-Cert Due Date  02/05/19  -CP       User Key  (r) = Recorded By, (t) = Taken By, (c) = Cosigned By    Initials Name Provider Type    CP Aarti Maurice PTA Physical Therapy Assistant                         Time Calculation:   Start Time: 1105  Stop Time: 1145  Time Calculation (min): 40 min  Total Timed Code Minutes- PT: 40 minute(s)  Therapy Suggested Charges     Code   Minutes Charges    None           Therapy Charges for Today     Code Description Service Date Service Provider Modifiers Qty    92770897934 HC PT THER PROC EA 15 MIN 1/23/2019 Aarti Maurice PTA GP 3                    Aarti Maurice PTA  1/23/2019

## 2019-02-05 ENCOUNTER — HOSPITAL ENCOUNTER (OUTPATIENT)
Dept: PAIN MANAGEMENT | Age: 54
Discharge: HOME | End: 2019-02-05
Payer: COMMERCIAL

## 2019-02-05 VITALS
TEMPERATURE: 98.1 F | OXYGEN SATURATION: 97 % | DIASTOLIC BLOOD PRESSURE: 97 MMHG | WEIGHT: 250 LBS | HEART RATE: 69 BPM | SYSTOLIC BLOOD PRESSURE: 142 MMHG | RESPIRATION RATE: 18 BRPM | BODY MASS INDEX: 42.68 KG/M2 | HEIGHT: 64 IN

## 2019-02-05 DIAGNOSIS — M54.42 CHRONIC BILATERAL LOW BACK PAIN WITH BILATERAL SCIATICA: ICD-10-CM

## 2019-02-05 DIAGNOSIS — G89.29 CHRONIC BILATERAL LOW BACK PAIN WITH BILATERAL SCIATICA: ICD-10-CM

## 2019-02-05 DIAGNOSIS — M54.41 CHRONIC BILATERAL LOW BACK PAIN WITH BILATERAL SCIATICA: ICD-10-CM

## 2019-02-05 PROCEDURE — 99214 OFFICE O/P EST MOD 30 MIN: CPT | Performed by: NURSE PRACTITIONER

## 2019-02-05 PROCEDURE — 99213 OFFICE O/P EST LOW 20 MIN: CPT

## 2019-02-05 ASSESSMENT — PAIN DESCRIPTION - FREQUENCY: FREQUENCY: CONTINUOUS

## 2019-02-05 ASSESSMENT — PAIN DESCRIPTION - PROGRESSION: CLINICAL_PROGRESSION: NOT CHANGED

## 2019-02-05 ASSESSMENT — ACTIVITIES OF DAILY LIVING (ADL): EFFECT OF PAIN ON DAILY ACTIVITIES: LIMITS ACTIVITY

## 2019-02-05 ASSESSMENT — PAIN DESCRIPTION - DESCRIPTORS: DESCRIPTORS: ACHING;CONSTANT;SHARP;RADIATING

## 2019-02-05 ASSESSMENT — PAIN DESCRIPTION - PAIN TYPE: TYPE: CHRONIC PAIN

## 2019-02-05 ASSESSMENT — PAIN DESCRIPTION - LOCATION: LOCATION: BACK

## 2019-02-05 ASSESSMENT — ENCOUNTER SYMPTOMS
BACK PAIN: 1
CONSTIPATION: 0

## 2019-02-05 ASSESSMENT — PAIN SCALES - GENERAL: PAINLEVEL_OUTOF10: 4

## 2019-02-05 ASSESSMENT — PAIN DESCRIPTION - ORIENTATION: ORIENTATION: LOWER;MID

## 2019-02-05 ASSESSMENT — PAIN - FUNCTIONAL ASSESSMENT: PAIN_FUNCTIONAL_ASSESSMENT: PREVENTS OR INTERFERES SOME ACTIVE ACTIVITIES AND ADLS

## 2019-02-05 ASSESSMENT — PAIN DESCRIPTION - ONSET: ONSET: ON-GOING

## 2019-02-07 RX ORDER — HYDROCODONE BITARTRATE AND ACETAMINOPHEN 7.5; 325 MG/1; MG/1
1 TABLET ORAL EVERY 8 HOURS PRN
Qty: 90 TABLET | Refills: 0 | Status: SHIPPED | OUTPATIENT
Start: 2019-02-10 | End: 2019-03-05 | Stop reason: SDUPTHER

## 2019-02-20 ENCOUNTER — ANESTHESIA (OUTPATIENT)
Dept: GASTROENTEROLOGY | Facility: HOSPITAL | Age: 54
End: 2019-02-20

## 2019-02-20 ENCOUNTER — ANESTHESIA EVENT (OUTPATIENT)
Dept: GASTROENTEROLOGY | Facility: HOSPITAL | Age: 54
End: 2019-02-20

## 2019-02-20 ENCOUNTER — HOSPITAL ENCOUNTER (OUTPATIENT)
Facility: HOSPITAL | Age: 54
Setting detail: HOSPITAL OUTPATIENT SURGERY
Discharge: HOME | End: 2019-02-20
Attending: INTERNAL MEDICINE | Admitting: INTERNAL MEDICINE

## 2019-02-20 VITALS
HEART RATE: 98 BPM | BODY MASS INDEX: 42.76 KG/M2 | SYSTOLIC BLOOD PRESSURE: 111 MMHG | TEMPERATURE: 98.7 F | DIASTOLIC BLOOD PRESSURE: 72 MMHG | OXYGEN SATURATION: 98 % | HEIGHT: 64 IN | WEIGHT: 250.5 LBS | RESPIRATION RATE: 18 BRPM

## 2019-02-20 DIAGNOSIS — Z12.11 SCREEN FOR COLON CANCER: ICD-10-CM

## 2019-02-20 PROCEDURE — 88305 TISSUE EXAM BY PATHOLOGIST: CPT | Performed by: INTERNAL MEDICINE

## 2019-02-20 PROCEDURE — 88305 TISSUE EXAM BY PATHOLOGIST: CPT | Performed by: PATHOLOGY

## 2019-02-20 PROCEDURE — 25010000002 PROPOFOL 10 MG/ML EMULSION: Performed by: NURSE ANESTHETIST, CERTIFIED REGISTERED

## 2019-02-20 RX ORDER — DEXTROSE AND SODIUM CHLORIDE 5; .45 G/100ML; G/100ML
20 INJECTION, SOLUTION INTRAVENOUS CONTINUOUS
Status: DISCONTINUED | OUTPATIENT
Start: 2019-02-20 | End: 2019-02-20 | Stop reason: HOSPADM

## 2019-02-20 RX ORDER — LIDOCAINE HYDROCHLORIDE 10 MG/ML
INJECTION, SOLUTION INFILTRATION; PERINEURAL AS NEEDED
Status: DISCONTINUED | OUTPATIENT
Start: 2019-02-20 | End: 2019-02-20 | Stop reason: SURG

## 2019-02-20 RX ORDER — PROPOFOL 10 MG/ML
VIAL (ML) INTRAVENOUS AS NEEDED
Status: DISCONTINUED | OUTPATIENT
Start: 2019-02-20 | End: 2019-02-20 | Stop reason: SURG

## 2019-02-20 RX ADMIN — PROPOFOL 50 MG: 10 INJECTION, EMULSION INTRAVENOUS at 14:39

## 2019-02-20 RX ADMIN — PROPOFOL 120 MG: 10 INJECTION, EMULSION INTRAVENOUS at 14:23

## 2019-02-20 RX ADMIN — PROPOFOL 30 MG: 10 INJECTION, EMULSION INTRAVENOUS at 14:30

## 2019-02-20 RX ADMIN — PROPOFOL 20 MG: 10 INJECTION, EMULSION INTRAVENOUS at 14:27

## 2019-02-20 RX ADMIN — DEXTROSE AND SODIUM CHLORIDE 20 ML/HR: 5; 450 INJECTION, SOLUTION INTRAVENOUS at 14:14

## 2019-02-20 RX ADMIN — PROPOFOL 30 MG: 10 INJECTION, EMULSION INTRAVENOUS at 14:34

## 2019-02-20 RX ADMIN — PROPOFOL 50 MG: 10 INJECTION, EMULSION INTRAVENOUS at 14:36

## 2019-02-20 RX ADMIN — LIDOCAINE HYDROCHLORIDE 100 MG: 10 INJECTION, SOLUTION INFILTRATION; PERINEURAL at 14:23

## 2019-02-20 NOTE — H&P
Reno Clemente DO,[facility]  Gastroenterology  Hepatology  Endoscopy  Board Certified in Internal Medicine and gastroenterology  [address] [facility], [address]  [place]. [address]  - [phone]   F - [phone]     GASTROENTEROLOGY HISTORY AND PHYSICAL  NOTE   RENO CLEMENTE DO.         SUBJECTIVE:   2/20/2019    Name: Emiliana Carlos  DOD: 1965        Chief Complaint:     Subjective : Screening for colon cancer    Patient is 53 y.o. female presents with desire for elective colonoscopy.      ROS/HISTORY/ CURRENT MEDICATIONS/OBJECTIVE/VS/PE:   Review of Systems:  All systems unremarkable unless specified below.  Constitutional   HENT  Eyes   Respiratory    Cardiovascular  Gastrointestinal   Endocrine  Genitourinary    Musculoskeletal   Skin  Allergic/Immunologic    Neurological    Hematological  Psychiatric/Behavioral    History:     Past Medical History:   Diagnosis Date   • Acute bronchitis    • Backache    • Breast cyst    • Breast lump     left breast 4 mm mass 4 oclock benign on bx    • Chest pain    • Foot pain    • Gastroesophageal reflux disease    • Generalized anxiety disorder    • History of ectopic pregnancy    • History of measles    • History of polycystic ovaries    • History of varicella    • Inflammation of foot joint    • Muscle strain     Strain of muscle of trunk   • Neck pain    • Osteoarthritis of foot joint    • Otitis media    • Pain in limb    • Peroneal tendinitis    • Plantar fasciitis    • Psoriasis    • Rheumatoid arthritis (CMS/HCC)    • Tendinitis of ankle     Tendinitis AND/OR tenosynovitis of the ankle region - EDL   • Tobacco dependence syndrome    • Upper respiratory infection      Past Surgical History:   Procedure Laterality Date   • BREAST BIOPSY     • BREAST SURGERY  04/05/2013    BX BREAST PERCUT W/IMAGE 79157 (1) - Left breast ultrasound guided mammotome biopsy with 8 gauge needle and left a clip   • DENTAL PROCEDURE      [place] teeth extraction   •  DIAGNOSTIC LAPAROSCOPY  12/30/1994    With exploratory laparotomy and right uterine cornual resection.   • HERNIA REPAIR  12/12/2008    Ventral hernia repair with mesh   • OTHER SURGICAL HISTORY  05/02/2005    PHLEB VEINS - EXTREM - (TO 20) 09254 (1) -  High ligation and complete stripping of two separate saphenous systems of the right lower extermity. Multiple puncture phlebectomies of varicose veins of right lower extremity   • OTHER SURGICAL HISTORY  03/22/2015    WALKING/AMBULATORY SHORTLEG CAST 14140 (2)   • PAP SMEAR  05/06/2010    Negative   • TOTAL ABDOMINAL HYSTERECTOMY WITH SALPINGO OOPHORECTOMY  11/13/2006    With right [id number]     Family History   Problem Relation Age of Onset   • Breast cancer Mother      Social History     Tobacco Use   • Smoking status: Current Every Day Smoker     Packs/day: 1.00   Substance Use Topics   • Alcohol use: No     Frequency: Never   • Drug use: Defer     Medications Prior to Admission   Medication Sig Dispense Refill Last Dose   • conjugated estrogens (PREMARIN) 0.625 MG/GM vaginal cream Insert  into the vagina daily. Insert 0.5 g twice weekly 30 g 2    • escitalopram (LEXAPRO) 10 MG tablet Take 10 mg by mouth daily.      • Etanercept (ENBREL) 25 MG reconstituted solution Inject  under the skin.      • folic acid (FOLVITE) 1 MG tablet Take 1 mg by mouth daily.      • gabapentin (NEURONTIN) 100 MG capsule Take 100 mg by mouth 3 (three) times a day.   Taking   • HYDROcodone-acetaminophen (NORCO)  MG per tablet Take 1 tablet by mouth every 6 (six) hours as needed for moderate pain (4-6).   Taking   • methotrexate 2.5 MG tablet Take  by mouth 3 (three) doses each week. Take doses 12 (twelve) hours apart from each other.        Allergies:  Codeine    I have reviewed the patients medical history, surgical history and family history in the available medical record system.     Current Medications:     Current Facility-Administered Medications    Medication Dose Route Frequency Provider Last Rate Last Dose   • dextrose 5 % and sodium chloride 0.45 % infusion  20 mL/hr Intravenous Continuous Matthew Brizuela DO           Objective     Physical Exam:        Physical Exam:  General Appearance:    Alert, cooperative, in no acute distress   Head:    Normocephalic, without obvious abnormality, atraumatic   Eyes:            Lids and lashes normal, conjunctivae and sclerae normal, no   icterus, no pallor, corneas clear, PERRLA   Ears:    Ears appear intact with no abnormalities noted   Throat:   No oral lesions, no thrush, oral mucosa moist   Neck:   No adenopathy, supple, trachea midline, no thyromegaly, no     carotid bruit, no JVD   Back:     No kyphosis present, no scoliosis present, no skin lesions,       erythema or scars, no tenderness to percussion or                   palpation,   range of motion normal   Lungs:     Clear to auscultation,respirations regular, even and                   unlabored    Heart:    Regular rhythm and normal rate, normal S1 and S2, no            murmur, no gallop, no rub, no click   Breast Exam:    Deferred   Abdomen:     Normal bowel sounds, no masses, no organomegaly, soft        non-tender, non-distended, no guarding, no rebound                 tenderness   Genitalia:    Deferred   Extremities:   Moves all extremities well, no edema, no cyanosis, no              redness   Pulses:   Pulses palpable and equal bilaterally   Skin:   No bleeding, bruising or rash   Lymph nodes:   No palpable adenopathy   Neurologic:   Cranial nerves 2 - 12 grossly intact, sensation intact, DTR        present and equal bilaterally      Results Review:     Lab Results   Component Value Date    WBC 8.5 05/19/2015    WBC 12.6 (H) 04/17/2014    HGB 14.1 05/19/2015    HGB 14.1 04/17/2014    HCT 42.3 05/19/2015    HCT 42.4 04/17/2014     05/19/2015     04/17/2014             No results found for: LIPASE  No results found for: INR        Radiology Review:  Imaging Results (last 72 hours)     ** No results found for the last 72 hours. **           I reviewed the patient's new clinical results.  I reviewed the patient's new imaging results and agree with the interpretation.     ASSESSMENT/PLAN:   ASSESSMENT:   1.  Screening for colon cancer    PLAN:   1.  Colonoscopy    Risk and benefits associated with the procedure are reviewed with the patient.  The patient wishes to proceed      Matthew Brizuela DO  02/20/19  1:22 PM

## 2019-02-20 NOTE — ANESTHESIA POSTPROCEDURE EVALUATION
Patient: Emiliana Carlos    Procedure Summary     Date:  02/20/19 Room / Location:  [facility]COPY 2 / [facility]    Anesthesia Start:  1415 Anesthesia Stop:  1445    Procedure:  COLONOSCOPY (N/A ) Diagnosis:       Screen for colon cancer      Colon polyp      (Screen for colon cancer [Z12.11])    Surgeon:  Matthew Brizuela DO Provider:  Marlen Lu CRNA    Anesthesia Type:  MAC ASA Status:  3          Anesthesia Type: MAC  Last vitals  BP   180/96 (02/20/19 1400)   Temp   98.1 °F (36.7 °C) (02/20/19 1400)   Pulse   83 (02/20/19 1400)   Resp   18 (02/20/19 1400)     SpO2   98 % (02/20/19 1400)     Post Anesthesia Care and Evaluation    Patient location during evaluation: bedside  Patient participation: waiting for patient participation  Level of consciousness: sleepy but conscious  Pain score: 0  Pain management: adequate  Airway patency: patent  Anesthetic complications: No anesthetic complications  PONV Status: none  Cardiovascular status: acceptable  Respiratory status: acceptable  Hydration status: acceptable

## 2019-02-20 NOTE — ANESTHESIA PREPROCEDURE EVALUATION
Anesthesia Evaluation     Patient summary reviewed and Nursing notes reviewed   NPO Solid Status: > 8 hours             Airway   Mallampati: II  TM distance: >3 FB  Neck ROM: full  No difficulty expected  Dental    (+) poor dentition    Pulmonary - normal exam   (+) recent URI resolved,   Cardiovascular - negative cardio ROS and normal exam        Neuro/Psych  (+) psychiatric history Anxiety and Depression,     GI/Hepatic/Renal/Endo    (+)  GERD well controlled,      Musculoskeletal     (+) neck pain,   Abdominal  - normal exam   Substance History - negative use     OB/GYN negative ob/gyn ROS         Other   (+) arthritis       Other Comment: rheumatoid                  Anesthesia Plan    ASA 3     MAC     intravenous induction   Anesthetic plan, all risks, benefits, and alternatives have been provided, discussed and informed consent has been obtained with: patient.

## 2019-02-21 LAB
LAB AP CASE REPORT: NORMAL
PATH REPORT.FINAL DX SPEC: NORMAL
PATH REPORT.GROSS SPEC: NORMAL

## 2019-03-05 DIAGNOSIS — M54.42 CHRONIC BILATERAL LOW BACK PAIN WITH BILATERAL SCIATICA: ICD-10-CM

## 2019-03-05 DIAGNOSIS — M54.41 CHRONIC BILATERAL LOW BACK PAIN WITH BILATERAL SCIATICA: ICD-10-CM

## 2019-03-05 DIAGNOSIS — G89.29 CHRONIC BILATERAL LOW BACK PAIN WITH BILATERAL SCIATICA: ICD-10-CM

## 2019-03-05 RX ORDER — HYDROCODONE BITARTRATE AND ACETAMINOPHEN 7.5; 325 MG/1; MG/1
1 TABLET ORAL EVERY 8 HOURS PRN
Qty: 90 TABLET | Refills: 0 | Status: SHIPPED | OUTPATIENT
Start: 2019-03-12 | End: 2019-04-09 | Stop reason: SDUPTHER

## 2019-03-06 ENCOUNTER — HOSPITAL ENCOUNTER (OUTPATIENT)
Dept: PAIN MANAGEMENT | Age: 54
Discharge: HOME | End: 2019-03-06
Payer: COMMERCIAL

## 2019-03-06 VITALS
HEART RATE: 82 BPM | TEMPERATURE: 96.8 F | DIASTOLIC BLOOD PRESSURE: 89 MMHG | RESPIRATION RATE: 18 BRPM | SYSTOLIC BLOOD PRESSURE: 138 MMHG | OXYGEN SATURATION: 96 %

## 2019-03-06 PROCEDURE — 20611 DRAIN/INJ JOINT/BURSA W/US: CPT

## 2019-03-06 PROCEDURE — 6360000002 HC RX W HCPCS

## 2019-03-06 PROCEDURE — 2500000003 HC RX 250 WO HCPCS

## 2019-03-06 PROCEDURE — 20552 NJX 1/MLT TRIGGER POINT 1/2: CPT | Performed by: NURSE PRACTITIONER

## 2019-03-06 PROCEDURE — 76942 ECHO GUIDE FOR BIOPSY: CPT | Performed by: NURSE PRACTITIONER

## 2019-03-06 RX ORDER — LIDOCAINE HYDROCHLORIDE 10 MG/ML
INJECTION, SOLUTION EPIDURAL; INFILTRATION; INTRACAUDAL; PERINEURAL
Status: COMPLETED | OUTPATIENT
Start: 2019-03-06 | End: 2019-03-06

## 2019-03-06 RX ORDER — TRIAMCINOLONE ACETONIDE 40 MG/ML
INJECTION, SUSPENSION INTRA-ARTICULAR; INTRAMUSCULAR
Status: COMPLETED | OUTPATIENT
Start: 2019-03-06 | End: 2019-03-06

## 2019-03-06 RX ORDER — BUPIVACAINE HYDROCHLORIDE 5 MG/ML
INJECTION, SOLUTION EPIDURAL; INTRACAUDAL
Status: COMPLETED | OUTPATIENT
Start: 2019-03-06 | End: 2019-03-06

## 2019-03-06 RX ADMIN — LIDOCAINE HYDROCHLORIDE 1 ML: 10 INJECTION, SOLUTION EPIDURAL; INFILTRATION; INTRACAUDAL; PERINEURAL at 08:26

## 2019-03-06 RX ADMIN — TRIAMCINOLONE ACETONIDE 40 MG: 40 INJECTION, SUSPENSION INTRA-ARTICULAR; INTRAMUSCULAR at 08:26

## 2019-03-06 RX ADMIN — BUPIVACAINE HYDROCHLORIDE 1 ML: 5 INJECTION, SOLUTION EPIDURAL; INTRACAUDAL at 08:25

## 2019-03-06 ASSESSMENT — PAIN SCALES - GENERAL: PAINLEVEL_OUTOF10: 5

## 2019-03-08 ENCOUNTER — TELEPHONE (OUTPATIENT)
Dept: PAIN MANAGEMENT | Age: 54
End: 2019-03-08

## 2019-04-09 ENCOUNTER — HOSPITAL ENCOUNTER (OUTPATIENT)
Dept: PAIN MANAGEMENT | Age: 54
Discharge: HOME | End: 2019-04-09
Payer: COMMERCIAL

## 2019-04-09 VITALS
OXYGEN SATURATION: 96 % | BODY MASS INDEX: 42.68 KG/M2 | RESPIRATION RATE: 18 BRPM | WEIGHT: 250 LBS | HEART RATE: 75 BPM | DIASTOLIC BLOOD PRESSURE: 89 MMHG | TEMPERATURE: 97.2 F | SYSTOLIC BLOOD PRESSURE: 131 MMHG | HEIGHT: 64 IN

## 2019-04-09 DIAGNOSIS — G89.29 CHRONIC BILATERAL LOW BACK PAIN WITH BILATERAL SCIATICA: ICD-10-CM

## 2019-04-09 DIAGNOSIS — M54.42 CHRONIC BILATERAL LOW BACK PAIN WITH BILATERAL SCIATICA: ICD-10-CM

## 2019-04-09 DIAGNOSIS — M54.41 CHRONIC BILATERAL LOW BACK PAIN WITH BILATERAL SCIATICA: ICD-10-CM

## 2019-04-09 PROCEDURE — 99214 OFFICE O/P EST MOD 30 MIN: CPT

## 2019-04-09 PROCEDURE — 99213 OFFICE O/P EST LOW 20 MIN: CPT | Performed by: NURSE PRACTITIONER

## 2019-04-09 RX ORDER — HYDROCODONE BITARTRATE AND ACETAMINOPHEN 7.5; 325 MG/1; MG/1
1 TABLET ORAL EVERY 8 HOURS PRN
Qty: 90 TABLET | Refills: 0 | Status: SHIPPED | OUTPATIENT
Start: 2019-04-11 | End: 2019-05-13 | Stop reason: SDUPTHER

## 2019-04-09 ASSESSMENT — ENCOUNTER SYMPTOMS
BACK PAIN: 1
CONSTIPATION: 0

## 2019-04-09 ASSESSMENT — PAIN DESCRIPTION - FREQUENCY: FREQUENCY: CONTINUOUS

## 2019-04-09 ASSESSMENT — PAIN DESCRIPTION - DIRECTION: RADIATING_TOWARDS: INTO LEFT BUTTOCKS

## 2019-04-09 ASSESSMENT — PAIN DESCRIPTION - PROGRESSION: CLINICAL_PROGRESSION: NOT CHANGED

## 2019-04-09 ASSESSMENT — ACTIVITIES OF DAILY LIVING (ADL): EFFECT OF PAIN ON DAILY ACTIVITIES: LIMITS ACTIVITY

## 2019-04-09 ASSESSMENT — PAIN DESCRIPTION - ORIENTATION: ORIENTATION: LOWER;MID

## 2019-04-09 ASSESSMENT — PAIN SCALES - GENERAL: PAINLEVEL_OUTOF10: 4

## 2019-04-09 ASSESSMENT — PAIN DESCRIPTION - ONSET: ONSET: ON-GOING

## 2019-04-09 ASSESSMENT — PAIN DESCRIPTION - PAIN TYPE: TYPE: CHRONIC PAIN

## 2019-04-09 ASSESSMENT — PAIN - FUNCTIONAL ASSESSMENT: PAIN_FUNCTIONAL_ASSESSMENT: PREVENTS OR INTERFERES SOME ACTIVE ACTIVITIES AND ADLS

## 2019-04-09 ASSESSMENT — PAIN DESCRIPTION - LOCATION: LOCATION: BACK

## 2019-04-09 NOTE — PROGRESS NOTES
[facility]  Office Note    Patient Name: Tino Luna    MR #: [id number]    Account #: [de-identified]    : 1965    Age: 48 y.o. Sex: female    Date: 2019    PCP: Arleen Moncada    Chief Complaint:   Chief Complaint   Patient presents with    Back Pain     mid to lower       History of Present Illness:     Tino Luna is a 48 y.o. female who presents to the office for follow up after having:   Left SI Joint Injection on 3/6/19: 70% relief for about 3 days, she reports she is still receiving about 40% relief at this time    Patient has had a flare up with her arthritis. Patient was two weeks behind on her embrel injection. She had increase in her pain. Patient has been using ice and heat to treat pain. This has effected her greatly in her ADL ability. Patients last urine drug screen on 2019 was negative for tizanidine. Patient does not take routinely    Past Visit HPI:  19  Roxie Israel is a 48 y.o. female who presents to the office for follow up physical therapy. Patient reports PT was not effective with low back pain. Patient was given show lift for leg length difference. This has helped with patient \"tripping\"     Patient stated that this past weekend she started having pain in her right hip after pulling her grandson in a wagon.      12/10/18  The patient is a 48 y.o. female who presented to the office on referral with primary complaints of lower back pain that has been present for many years. Patient also has diagnosis of RA/psoriatic arthritis and left ankle, right knee and left shoulder give her the most problems with that. Previously, patient was being treated by [address] in [place], [place] and more recently transferred to [facility] location with the same clinic.       Patient has taken gabapentin in the past that caused her to feel drugged. Patient states she can not tell a difference between the Mobic and Aleve.    Back Pain   This is a chronic problem. The current episode started more than 1 year ago. The problem occurs constantly. The problem has been gradually worsening since onset. The pain is present in the lumbar spine and sacro-iliac. The quality of the pain is described as aching and cramping. Radiates to: left leg more than right. The pain is at a severity of 5/10. The symptoms are aggravated by twisting (driving for long periods, after standing long periods patient has worsening back pain that is relieved by sitting, however after sitting then she has worse pain standing. ). Associated symptoms include leg pain, numbness, paresthesias and perianal numbness. Pertinent negatives include no bladder incontinence, bowel incontinence or weakness. She has tried chiropractic manipulation, analgesics, heat, ice and NSAIDs (Episom Salt hot baths) for the symptoms. The treatment provided mild relief. Current PE.7    Past PEG: 3.7    Annual Screens:    ORT Score: 0    PHQ-9 Score: 2    Current Pain Assessment  Pain Assessment  Pain Assessment: 0-10  Pain Level: 4  Patient's Stated Pain Goal: No pain  Pain Type: Chronic pain  Pain Location: Back  Pain Orientation: Lower, Mid  Pain Radiating Towards: into left buttocks  Pain Descriptors: Aching, Sharp, Constant, Tingling  Pain Frequency: Continuous  Pain Onset: On-going  Clinical Progression: Not changed  Effect of Pain on Daily Activities: limits activity  Functional Pain Assessment: Prevents or interferes some active activities and ADLs  Non-Pharmaceutical Pain Intervention(s): Repositioned, Rest  Response to Pain Intervention: Patient Satisfied  Multiple Pain Sites: No    Employment: [employer]    Previous Injury: No    Previous Physical Therapy In the last 6 months? [facility]    Did Physical Therapy make the pain better? No, no change    MRI in the two last year? No   see images below    CT scan in last 12 months? No    X-ray last 12 months? No    Nerve Conduction Study / EMG No    Injections in the past? Yes    Did the injections help relieve the pain? Yes    Past Medical Histoy  Past Medical History:   Diagnosis Date    Arthritis     Depression     Hypertension     Indigestion     Rheumatoid arthritis ([facility] [address]        Surgery History  Past Surgical History:   Procedure Laterality Date    HERNIA REPAIR      HYSTERECTOMY      VASCULAR SURGERY      vein stripping        Family History  family history is not on file. Social History    Social History     Tobacco Use    Smoking status: Current Every Day Smoker     Packs/day: 1.00     Types: Cigarettes    Smokeless tobacco: Never Used   Substance Use Topics    Alcohol use: No       Allergies  Codeine     Current Medications  Current Outpatient Medications   Medication Sig Dispense Refill    HYDROcodone-acetaminophen (NORCO) 7.5-325 MG per tablet Take 1 tablet by mouth every 8 hours as needed for Pain for up to 30 days. (may fill 3/12/19) 90 tablet 0    lisinopril (PRINIVIL;ZESTRIL) 5 MG tablet Take 1 tablet by mouth daily  5    escitalopram (LEXAPRO) 20 MG tablet Take 1 tablet by mouth daily  5    methotrexate (RHEUMATREX) 2.5 MG chemo tablet Take 6 tablets by mouth once a week  0    folic acid (FOLVITE) 1 MG tablet Take 1 tablet by mouth daily  0    ENBREL SURECLICK 50 MG/ML SOAJ Inject 50 mg into the skin once a week  0    phentermine (ADIPEX-P) 37.5 MG tablet Take 0.5 tablets by mouth daily. Suzi Nelson 0    omeprazole (PRILOSEC) 10 MG delayed release capsule Take 10-20 mg by mouth daily      Naproxen Sodium (ALEVE) 220 MG CAPS Take by mouth as needed for Pain      tiZANidine (ZANAFLEX) 4 MG tablet 1/4 tablet with meals 1/2 to whole tablet at bedtime 60 tablet 1     No current facility-administered medications for this encounter. Review of Systems:  Review of Systems   Constitutional: Negative for activity change. Gastrointestinal: Negative for constipation. Musculoskeletal: Positive for arthralgias, back pain and myalgias. Negative for neck pain. Neurological: Negative for weakness and numbness. Paresthesias. Psychiatric/Behavioral: Negative for agitation, self-injury and suicidal ideas. The patient is not nervous/anxious. 14 point ROS negative besides that noted in HPI    Physical Exam:    Vitals:    04/09/19 1115   BP: 131/89   Pulse: 75   Resp: 18   Temp: 97.2 °F (36.2 °C)   TempSrc: Oral   SpO2: 96%   Weight: 250 lb (113.4 kg)   Height: 5' 4\" (1.626 m)       Body mass index is 42.91 kg/m². Physical Exam   Constitutional: She is oriented to person, place, and time. She appears well-developed and well-nourished. No distress. HENT:   Head: Normocephalic. Right Ear: External ear normal.   Left Ear: External ear normal.   Nose: Nose normal.   Eyes: Pupils are equal, round, and reactive to light. Conjunctivae and EOM are normal.   Neck: Normal range of motion. Neck supple. Cardiovascular: Normal rate. Pulmonary/Chest: Effort normal.   Abdominal: Soft. Bowel sounds are normal. There is no hepatosplenomegaly. Musculoskeletal:        Lumbar back: She exhibits decreased range of motion, pain and spasm. Back:    + SLR left  Lasegue Maneuver + Left unable to sleep on left side due to increase in pain and numbness. R SI TTP   Neurological: She is alert and oriented to person, place, and time. No cranial nerve deficit. Skin: Skin is warm and dry. Psychiatric: She has a normal mood and affect. Her behavior is normal. Judgment and thought content normal.   Nursing note and vitals reviewed. LAST UDS: 2/5/19 Non compliant with tizanidine    Labs:  No results found for: NA, K, CL, CO2, BUN, CREATININE, GLUCOSE, CALCIUM     No results found for: WBC, HGB, HCT, MCV, PLT    Recent Imaging:  PROCEDURE- Lumbar spine. History back ache  Findings/conclusion-  Seven views lumbar spine are obtained including flexion and extension views.   There is disc space narrowing at L5-S1. There are degenerative changes of the apophyseal joints at L4-L5, and L5-S1. Lumbar spine is otherwise unremarkable . no fracture, dilatation, or  areas of bony destruction. There is no evidence of any subluxation  between flexion and extension positions. Electronically Signed ByDrew Saunders MD On: 2014-04-17 17:23:46      Assessment:  Principal Problem:    Chronic bilateral low back pain with bilateral sciatica  Active Problems:    Sacroiliitis ([address]    Arthritis  Resolved Problems:    * No resolved hospital problems. *      Plan: 1. Continue Zanaflex gradual taper does not take routinely  2. Stop [address] cream + gabapentin Pain # 4 could not tell that this helped much. 3. Will prescribe Norco 7.5 mg TID prn # 90. Will continue this until effective treatment with injections  4. Repeat  Left SI and add Left Troch bursa, and Left Piriformis due to pain and assessment. 5. Follow up post procedure or sooner if need. Discussion: Discussed exam findings and plan of care with patient. Patient agreed with POC and questions were asked and answered. Activity: discussed exercise as beneficial to pain reduction, encouraged stretching exercisewith a focus on torso strengthening. Education Provided by Provider:  Review of Kirke Estelle / Agreement Review / ORT Calculated / PHQ-9 Reviewed / Compliance Issues Discussed / Cognitive Behavior Needs / Exercise / Review of Test / Financial Issues / Teaching / Smoking Cessation / Tobacco/Alcohol Use    Controlled Substance Monitoring:   Discussed with patient possible medication side effects, risk of tolerance, dependenceand alternative treatments. Discussed the growing epidemic in the [place] with the overprescribing and at times the abuse of narcotics. Discussed the detrimental effects of long term narcotic use in younger patients.  Patientencouraged to set daily goals of exercising and if on narcotics decreasing daily narcotic intake. Discussed with the patient about the development of hyperalgesia with long term narcotic intake. CC:  Lucinda Lowe, APRN - CNP, 4/9/2019 at 11:40 AM    EMR dragon/transcription disclaimer: Much of this encounter note is electronic transcription/translation of spoken language to printed tach. Electronic translation of spoken language may be erroneous, or at times, nonsensical words or phrases may be inadvertently transcribed.  Although, I have reviewed the note for such errors, some may still exist.    Education Provided by Nurse  Review of Farooq Finn / Agreement Review / ORT Calculated / PHQ-9 Reviewed / Compliance Issues Discussed / Cognitive Behavior Needs / Exercise / Review of Test / Financial Issues / Teaching / Smoking Cessation / Tobacco/Alcohol Use   Electronically signed by Rima Allison RN on 4/9/19 at 12:02 PM    Providers Plan   Outgoing Referral / Pharmacy Consult / Test ordered / Obtained Test Results / Consults    New/HP Patient Picture Obtained / Prescriptions ordered-1

## 2019-04-23 ENCOUNTER — HOSPITAL ENCOUNTER (OUTPATIENT)
Dept: ULTRASOUND IMAGING | Facility: HOSPITAL | Age: 54
Discharge: HOME | End: 2019-04-23
Admitting: NURSE PRACTITIONER

## 2019-04-23 DIAGNOSIS — K43.9 ABDOMINAL WALL HERNIA: ICD-10-CM

## 2019-04-23 PROCEDURE — 76705 ECHO EXAM OF ABDOMEN: CPT

## 2019-05-13 DIAGNOSIS — M54.41 CHRONIC BILATERAL LOW BACK PAIN WITH BILATERAL SCIATICA: ICD-10-CM

## 2019-05-13 DIAGNOSIS — M54.42 CHRONIC BILATERAL LOW BACK PAIN WITH BILATERAL SCIATICA: ICD-10-CM

## 2019-05-13 DIAGNOSIS — G89.29 CHRONIC BILATERAL LOW BACK PAIN WITH BILATERAL SCIATICA: ICD-10-CM

## 2019-05-13 NOTE — TELEPHONE ENCOUNTER
Called pt to inform them we received their call and refill has been routed to the provider and to check with their pharmacy in 3-5 business days.  No answer no vm

## 2019-05-15 RX ORDER — HYDROCODONE BITARTRATE AND ACETAMINOPHEN 7.5; 325 MG/1; MG/1
1 TABLET ORAL EVERY 8 HOURS PRN
Qty: 90 TABLET | Refills: 0 | Status: SHIPPED | OUTPATIENT
Start: 2019-05-15 | End: 2019-06-10 | Stop reason: SDUPTHER

## 2019-06-10 DIAGNOSIS — M54.41 CHRONIC BILATERAL LOW BACK PAIN WITH BILATERAL SCIATICA: ICD-10-CM

## 2019-06-10 DIAGNOSIS — M54.42 CHRONIC BILATERAL LOW BACK PAIN WITH BILATERAL SCIATICA: ICD-10-CM

## 2019-06-10 DIAGNOSIS — G89.29 CHRONIC BILATERAL LOW BACK PAIN WITH BILATERAL SCIATICA: ICD-10-CM

## 2019-06-11 RX ORDER — HYDROCODONE BITARTRATE AND ACETAMINOPHEN 7.5; 325 MG/1; MG/1
1 TABLET ORAL EVERY 8 HOURS PRN
Qty: 90 TABLET | Refills: 0 | Status: SHIPPED | OUTPATIENT
Start: 2019-06-14 | End: 2019-07-15 | Stop reason: SDUPTHER

## 2019-06-26 ENCOUNTER — CONSULT (OUTPATIENT)
Dept: SURGERY | Facility: CLINIC | Age: 54
End: 2019-06-26

## 2019-06-26 VITALS
HEIGHT: 64 IN | SYSTOLIC BLOOD PRESSURE: 140 MMHG | HEART RATE: 78 BPM | BODY MASS INDEX: 45.07 KG/M2 | DIASTOLIC BLOOD PRESSURE: 86 MMHG | TEMPERATURE: 98.4 F | WEIGHT: 264 LBS

## 2019-06-26 DIAGNOSIS — K46.9 RECURRENT HERNIA: Primary | ICD-10-CM

## 2019-06-26 PROCEDURE — 99204 OFFICE O/P NEW MOD 45 MIN: CPT | Performed by: SURGERY

## 2019-06-26 RX ORDER — ALBUTEROL SULFATE 90 UG/1
AEROSOL, METERED RESPIRATORY (INHALATION)
COMMUNITY
Start: 2019-06-24 | End: 2021-04-22

## 2019-06-26 RX ORDER — PHENTERMINE HYDROCHLORIDE 37.5 MG/1
37.5 TABLET ORAL DAILY
Refills: 0 | COMMUNITY
Start: 2019-05-15 | End: 2020-02-04

## 2019-06-26 RX ORDER — OMEPRAZOLE 10 MG/1
10-20 CAPSULE, DELAYED RELEASE ORAL
COMMUNITY
End: 2021-04-22

## 2019-06-26 RX ORDER — RANITIDINE 150 MG/1
TABLET ORAL
COMMUNITY
Start: 2019-06-24 | End: 2021-04-22

## 2019-06-26 RX ORDER — TIZANIDINE HYDROCHLORIDE 4 MG/1
CAPSULE, GELATIN COATED ORAL
COMMUNITY
Start: 2019-06-24 | End: 2021-04-22

## 2019-06-26 RX ORDER — LISINOPRIL 5 MG/1
TABLET ORAL
Refills: 5 | COMMUNITY
Start: 2019-05-07 | End: 2021-04-22

## 2019-06-26 RX ORDER — METHOTREXATE 2.5 MG/1
TABLET ORAL
COMMUNITY
Start: 2019-06-24 | End: 2019-06-26 | Stop reason: ALTCHOICE

## 2019-06-26 RX ORDER — HYDROCODONE BITARTRATE AND ACETAMINOPHEN 7.5; 325 MG/1; MG/1
1 TABLET ORAL EVERY 6 HOURS PRN
COMMUNITY

## 2019-06-26 NOTE — PROGRESS NOTES
Subjective   Emiliana Carlos is a 53 y.o. female     Chief Complaint: Recurrent umbilical hernia    History of Present Illness referred by Dr. Brizuela for recurrent umbilical hernia.  Patient had this umbilical hernia repaired back in 2008 by Dr. Vasques.  Mesh was placed in underlay position and was proceed mesh 8 x 8 cm and the fascia was closed superficial to this.  Patient was told at that time that she was at a very high risk of recurrence due to her body habitus and the fact that she smokes.  Since the hernia was incarcerated at the time Dr. Vasques went ahead and with the hernia repair.  Patient is still overweight and she still continues to smoke in addition to that she is been placed on methotrexate and Enbrel for arthritis which puts her at other increased risk for recurrent hernias.  Patient's job involves heavy lifting    Review of Systems   Constitutional: Negative.    HENT: Negative.    Eyes: Negative.    Respiratory: Negative.    Cardiovascular: Negative.    Gastrointestinal: Positive for abdominal pain.        Heartburn   Endocrine: Negative.    Genitourinary: Positive for frequency.   Musculoskeletal: Positive for arthralgias and back pain.   Skin:        Psoriasis palm of hands and soles of feet   Allergic/Immunologic: Negative.    Neurological: Positive for numbness.        Numbness & Tingling Left Leg     Past Medical History:   Diagnosis Date   • Acute bronchitis    • Backache    • Breast cyst    • Breast lump     left breast 4 mm mass 4 oclock benign on bx    • Chest pain    • Foot pain    • Gastroesophageal reflux disease    • Generalized anxiety disorder    • History of ectopic pregnancy    • History of measles    • History of polycystic ovaries    • History of varicella    • Inflammation of foot joint    • Muscle strain     Strain of muscle of trunk   • Neck pain    • Osteoarthritis of foot joint    • Otitis media    • Pain in limb    • Peroneal tendinitis    • Plantar fasciitis    •  Psoriasis    • Rheumatoid arthritis (CMS/HCC)    • Tendinitis of ankle     Tendinitis AND/OR tenosynovitis of the ankle region - EDL   • Tobacco dependence syndrome    • Upper respiratory infection      Past Surgical History:   Procedure Laterality Date   • BREAST BIOPSY     • BREAST SURGERY  04/05/2013    BX BREAST PERCUT W/IMAGE 26694 (1) - Left breast ultrasound guided mammotome biopsy with 8 gauge needle and left a clip   • COLONOSCOPY N/A 2/20/2019    Procedure: COLONOSCOPY;  Surgeon: Matthew Brizuela DO;  Location: [facility] ENDOSCOPY;  Service: Gastroenterology   • DENTAL PROCEDURE      Jones teeth extraction   • DIAGNOSTIC LAPAROSCOPY  12/30/1994    With exploratory laparotomy and right uterine cornual resection.   • HERNIA REPAIR  12/12/2008    Ventral hernia repair with mesh   • OTHER SURGICAL HISTORY  05/02/2005    PHLEB VEINS - EXTREM - (TO 25) 43578 (1) -  High ligation and complete stripping of two separate saphenous systems of the right lower extermity. Multiple puncture phlebectomies of varicose veins of right lower extremity   • OTHER SURGICAL HISTORY  03/22/2015    WALKING/AMBULATORY SHORTLEG CAST 26305 (2)   • PAP SMEAR  05/06/2010    Negative   • TOTAL ABDOMINAL HYSTERECTOMY WITH SALPINGO OOPHORECTOMY  11/13/2006    With right [id number]     Family History   Problem Relation Age of Onset   • Breast cancer Mother      Social History     Socioeconomic History   • Marital status:      Spouse name: Not on file   • Number of children: Not on file   • Years of education: Not on file   • Highest education level: Not on file   Tobacco Use   • Smoking status: Current Every Day Smoker     Packs/day: 1.00   Substance and Sexual Activity   • Alcohol use: No     Frequency: Never   • Drug use: Defer   • Sexual activity: Defer     Allergies   Allergen Reactions   • Codeine Rash       Home Medications:  Prior to Admission medications    Medication Sig Start Date End Date Taking?  Authorizing Provider   albuterol sulfate  (90 Base) MCG/ACT inhaler inhale 2 puff by inhalation route  every 4 - 6 hours as needed 6/24/19  Yes Norbert Sanchez MD   escitalopram (LEXAPRO) 10 MG tablet Take 10 mg by mouth daily.   Yes Norbert Sanchez MD   Etanercept (ENBREL) 25 MG reconstituted solution Inject  under the skin.   Yes Norbert Sanchez MD   HYDROcodone-acetaminophen (NORCO) 7.5-325 MG per tablet Take 1 tablet by mouth Every 6 (Six) Hours As Needed for Moderate Pain .   Yes Norbert Sanchez MD   lisinopril (PRINIVIL,ZESTRIL) 5 MG tablet TAKE ONE TABLET BY MOUTH ONCE DAILY 5/7/19  Yes Norbert Sanchez MD   methotrexate 2.5 MG tablet Take  by mouth 3 (three) doses each week. Take doses 12 (twelve) hours apart from each other.   Yes Norbert Sanchez MD   omeprazole (prilOSEC) 10 MG capsule Take 10-20 mg by mouth.   Yes Norbert Sanchez MD   phentermine (ADIPEX-P) 37.5 MG tablet Take 37.5 mg by mouth Daily. 5/15/19  Yes Norbert Sanchez MD   raNITIdine (WAL-YOCASTA MAXIMUM STRENGTH) 150 MG tablet take 1 tablet by oral route  twice daily 6/24/19  Yes Norbert Sanchez MD   TiZANidine (ZANAFLEX) 4 MG capsule take 1/4 capsule by oral route  every day 6/24/19  Yes Norbert Sanchez MD   methotrexate 2.5 MG tablet take 6 tablets by oral route weekly 6/24/19 6/26/19 Yes Norbert Sanchez MD   conjugated estrogens (PREMARIN) 0.625 MG/GM vaginal cream Insert  into the vagina daily. Insert 0.5 g twice weekly 9/1/16   Arcadio Fuentes APRN   folic acid (FOLVITE) 1 MG tablet Take 1 mg by mouth daily.  6/26/19  Norbert Sanchez MD   gabapentin (NEURONTIN) 100 MG capsule Take 100 mg by mouth 3 (three) times a day.  6/26/19  Norbert Sanchez MD   HYDROcodone-acetaminophen (NORCO)  MG per tablet Take 1 tablet by mouth every 6 (six) hours as needed for moderate pain (4-6).  6/26/19  Norbert Sanchez MD       Objective   Physical Exam   Constitutional:  She is oriented to person, place, and time. She appears well-developed and well-nourished. No distress.   HENT:   Head: Normocephalic and atraumatic.   Nose: Nose normal.   Eyes: Conjunctivae are normal.   Neck: Normal range of motion. No tracheal deviation present. No thyromegaly present.   Cardiovascular: Normal rate, regular rhythm and normal heart sounds.   No murmur heard.  Pulmonary/Chest: Effort normal and breath sounds normal. No respiratory distress. She has no wheezes. She has no rales. She exhibits no tenderness.   Abdominal: Soft. She exhibits no distension. There is no tenderness. There is no rebound and no guarding. A hernia is present.   Musculoskeletal: She exhibits no tenderness or deformity.   Neurological: She is alert and oriented to person, place, and time.   Skin: Skin is warm and dry. No rash noted.   Psychiatric: She has a normal mood and affect. Her behavior is normal. Judgment and thought content normal.   Vitals reviewed.  Obese white female no acute distress partially reducible hernia above into the right of her umbilicus.  No skin breakdown and umbilicus does not appear to be involved.    Assessment/Plan Recurrent umbilical hernia likely at the edge of the mesh that was placed before.  Patient is a significant risk for recurrence for any type of repair with her smoking history and other medical issues.  We went over this in detail.  Think she needs to have a CT scan to further characterize this before we can think about repairing this.  She also needs to strongly consider stopping smoking which would help her significantly.  She understands the importance of stopping smoking.      The encounter diagnosis was Recurrent hernia.                     This document has been electronically signed by Jules Ortiz MD on June 26, 2019 1:01 PM

## 2019-06-26 NOTE — PATIENT INSTRUCTIONS
BMI for Adults  Body mass index (BMI) is a number that is calculated from a person's weight and height. In most adults, the number is used to find how much of an adult's weight is made up of fat. BMI is not as accurate as a direct measure of body fat.  How is BMI calculated?  BMI is calculated by dividing weight in kilograms by height in meters squared. It can also be calculated by dividing weight in pounds by height in inches squared, then multiplying the resulting number by 703. Charts are available to help you find your BMI quickly and easily without doing this calculation.  How is BMI interpreted?  Health care professionals use BMI charts to identify whether an adult is underweight, at a normal weight, or overweight based on the following guidelines:  · Underweight: BMI less than 18.5.  · Normal weight: BMI between 18.5 and 24.9.  · Overweight: BMI between 25 and 29.9.  · Obese: BMI of 30 and above.    BMI is usually interpreted the same for males and females.  Weight includes both fat and muscle, so someone with a muscular build, such as an athlete, may have a BMI that is higher than 24.9. In cases like these, BMI may not accurately depict body fat. To determine if excess body fat is the cause of a BMI of 25 or higher, further assessments may need to be done by a health care provider.  Why is BMI a useful tool?  BMI is used to identify a possible weight problem that may be related to a medical problem or may increase the risk for medical problems. BMI can also be used to promote changes to reach a healthy weight.  This information is not intended to replace advice given to you by your health care provider. Make sure you discuss any questions you have with your health care provider.  Document Released: 08/29/2005 Document Revised: 04/27/2017 Document Reviewed: 05/15/2015  Building Successful Teens Interactive Patient Education © 2018 Elsevier Inc.  For more information:    Quit Now  [place]  1-800-QUIT-NOW  https://[place].quitlogix.org/en-[place]/  Steps to Quit Smoking  Smoking tobacco can be harmful to your health and can affect almost every organ in your body. Smoking puts you, and those around you, at risk for developing many serious chronic diseases. Quitting smoking is difficult, but it is one of the best things that you can do for your health. It is never too late to quit.  What are the benefits of quitting smoking?  When you quit smoking, you lower your risk of developing serious diseases and conditions, such as:  · Lung cancer or lung disease, such as COPD.  · Heart disease.  · Stroke.  · Heart attack.  · Infertility.  · Osteoporosis and bone fractures.  Additionally, symptoms such as coughing, wheezing, and shortness of breath may get better when you quit. You may also find that you get sick less often because your body is stronger at fighting off colds and infections. If you are pregnant, quitting smoking can help to reduce your chances of having a baby of low birth weight.  How do I get ready to quit?  When you decide to quit smoking, create a plan to make sure that you are successful. Before you quit:  · Pick a date to quit. Set a date within the next two weeks to give you time to prepare.  · Write down the reasons why you are quitting. Keep this list in places where you will see it often, such as on your bathroom mirror or in your car or wallet.  · Identify the people, places, things, and activities that make you want to smoke (triggers) and avoid them. Make sure to take these actions:  ¨ Throw away all cigarettes at home, at work, and in your car.  ¨ Throw away smoking accessories, such as ashtrays and lighters.  ¨ Clean your car and make sure to empty the ashtray.  ¨ Clean your home, including curtains and carpets.  · Tell your family, friends, and coworkers that you are quitting. Support from your loved ones can make quitting easier.  · Talk with your health care provider about  your options for quitting smoking.  · Find out what treatment options are covered by your health insurance.  What strategies can I use to quit smoking?  Talk with your healthcare provider about different strategies to quit smoking. Some strategies include:  · Quitting smoking altogether instead of gradually lessening how much you smoke over a period of time. Research shows that quitting “cold turkey” is more successful than gradually quitting.  · Attending in-person counseling to help you build problem-solving skills. You are more likely to have success in quitting if you attend several counseling sessions. Even short sessions of 10 minutes can be effective.  · Finding resources and support systems that can help you to quit smoking and remain smoke-free after you quit. These resources are most helpful when you use them often. They can include:  ¨ Online chats with a counselor.  ¨ Telephone quitlines.  ¨ Printed self-help materials.  ¨ Support groups or group counseling.  ¨ Text messaging programs.  ¨ Mobile phone applications.  · Taking medicines to help you quit smoking. (If you are pregnant or breastfeeding, talk with your health care provider first.) Some medicines contain nicotine and some do not. Both types of medicines help with cravings, but the medicines that include nicotine help to relieve withdrawal symptoms. Your health care provider may recommend:  ¨ Nicotine patches, gum, or lozenges.  ¨ Nicotine inhalers or sprays.  ¨ Non-nicotine medicine that is taken by mouth.  Talk with your health care provider about combining strategies, such as taking medicines while you are also receiving in-person counseling. Using these two strategies together makes you more likely to succeed in quitting than if you used either strategy on its own.  If you are pregnant or breastfeeding, talk with your health care provider about finding counseling or other support strategies to quit smoking. Do not take medicine to help you  quit smoking unless told to do so by your health care provider.  What things can I do to make it easier to quit?  Quitting smoking might feel overwhelming at first, but there is a lot that you can do to make it easier. Take these important actions:  · Reach out to your family and friends and ask that they support and encourage you during this time. Call telephone quitlines, reach out to support groups, or work with a counselor for support.  · Ask people who smoke to avoid smoking around you.  · Avoid places that trigger you to smoke, such as bars, parties, or smoke-break areas at work.  · Spend time around people who do not smoke.  · Lessen stress in your life, because stress can be a smoking trigger for some people. To lessen stress, try:  ¨ Exercising regularly.  ¨ Deep-breathing exercises.  ¨ Yoga.  ¨ Meditating.  ¨ Performing a body scan. This involves closing your eyes, scanning your body from head to toe, and noticing which parts of your body are particularly tense. Purposefully relax the muscles in those areas.  · Download or purchase mobile phone or tablet apps (applications) that can help you stick to your quit plan by providing reminders, tips, and encouragement. There are many free apps, such as QuitGuide from the CDC (Centers for Disease Control and Prevention). You can find other support for quitting smoking (smoking cessation) through smokefree.gov and other websites.  How will I feel when I quit smoking?  Within the first 24 hours of quitting smoking, you may start to feel some withdrawal symptoms. These symptoms are usually most noticeable 2-3 days after quitting, but they usually do not last beyond 2-3 weeks. Changes or symptoms that you might experience include:  · Mood swings.  · Restlessness, anxiety, or irritation.  · Difficulty concentrating.  · Dizziness.  · Strong cravings for sugary foods in addition to nicotine.  · Mild weight gain.  · Constipation.  · Nausea.  · Coughing or a sore  throat.  · Changes in how your medicines work in your body.  · A depressed mood.  · Difficulty sleeping (insomnia).  After the first 2-3 weeks of quitting, you may start to notice more positive results, such as:  · Improved sense of smell and taste.  · Decreased coughing and sore throat.  · Slower heart rate.  · Lower blood pressure.  · Clearer skin.  · The ability to breathe more easily.  · Fewer sick days.  Quitting smoking is very challenging for most people. Do not get discouraged if you are not successful the first time. Some people need to make many attempts to quit before they achieve long-term success. Do your best to stick to your quit plan, and talk with your health care provider if you have any questions or concerns.  This information is not intended to replace advice given to you by your health care provider. Make sure you discuss any questions you have with your health care provider.  Document Released: 12/12/2002 Document Revised: 08/15/2017 Document Reviewed: 05/03/2016  Elsevier Interactive Patient Education © 2017 Elsevier Inc.

## 2019-06-28 ENCOUNTER — HOSPITAL ENCOUNTER (OUTPATIENT)
Dept: CT IMAGING | Facility: HOSPITAL | Age: 54
Discharge: HOME | End: 2019-06-28
Admitting: SURGERY

## 2019-06-28 DIAGNOSIS — K46.9 RECURRENT HERNIA: ICD-10-CM

## 2019-06-28 PROCEDURE — 25010000002 IOPAMIDOL 61 % SOLUTION: Performed by: SURGERY

## 2019-06-28 PROCEDURE — 74177 CT ABD & PELVIS W/CONTRAST: CPT

## 2019-06-28 RX ADMIN — IOPAMIDOL 90 ML: 612 INJECTION, SOLUTION INTRAVENOUS at 10:19

## 2019-07-03 ENCOUNTER — HOSPITAL ENCOUNTER (OUTPATIENT)
Dept: PAIN MANAGEMENT | Age: 54
Discharge: HOME | End: 2019-07-03
Payer: COMMERCIAL

## 2019-07-03 VITALS
SYSTOLIC BLOOD PRESSURE: 135 MMHG | OXYGEN SATURATION: 96 % | RESPIRATION RATE: 18 BRPM | DIASTOLIC BLOOD PRESSURE: 98 MMHG | HEART RATE: 98 BPM | TEMPERATURE: 97.5 F

## 2019-07-03 DIAGNOSIS — M79.18 CHRONIC MYOFASCIAL PAIN: ICD-10-CM

## 2019-07-03 DIAGNOSIS — M70.62 TROCHANTERIC BURSITIS OF LEFT HIP: ICD-10-CM

## 2019-07-03 DIAGNOSIS — G89.29 CHRONIC MYOFASCIAL PAIN: ICD-10-CM

## 2019-07-03 PROCEDURE — 76942 ECHO GUIDE FOR BIOPSY: CPT | Performed by: NURSE PRACTITIONER

## 2019-07-03 PROCEDURE — 6360000002 HC RX W HCPCS

## 2019-07-03 PROCEDURE — 20611 DRAIN/INJ JOINT/BURSA W/US: CPT | Performed by: NURSE PRACTITIONER

## 2019-07-03 PROCEDURE — 76942 ECHO GUIDE FOR BIOPSY: CPT

## 2019-07-03 PROCEDURE — 20610 DRAIN/INJ JOINT/BURSA W/O US: CPT

## 2019-07-03 PROCEDURE — 2500000003 HC RX 250 WO HCPCS

## 2019-07-03 PROCEDURE — 20611 DRAIN/INJ JOINT/BURSA W/US: CPT

## 2019-07-03 PROCEDURE — 20553 NJX 1/MLT TRIGGER POINTS 3/>: CPT

## 2019-07-03 PROCEDURE — 20553 NJX 1/MLT TRIGGER POINTS 3/>: CPT | Performed by: NURSE PRACTITIONER

## 2019-07-03 RX ORDER — LIDOCAINE HYDROCHLORIDE 10 MG/ML
12 INJECTION, SOLUTION EPIDURAL; INFILTRATION; INTRACAUDAL; PERINEURAL ONCE
Status: DISCONTINUED | OUTPATIENT
Start: 2019-07-03 | End: 2019-07-05 | Stop reason: HOSPADM

## 2019-07-03 RX ORDER — BUPIVACAINE HYDROCHLORIDE 5 MG/ML
12 INJECTION, SOLUTION EPIDURAL; INTRACAUDAL ONCE
Status: DISCONTINUED | OUTPATIENT
Start: 2019-07-03 | End: 2019-07-05 | Stop reason: HOSPADM

## 2019-07-03 RX ORDER — TRIAMCINOLONE ACETONIDE 40 MG/ML
80 INJECTION, SUSPENSION INTRA-ARTICULAR; INTRAMUSCULAR ONCE
Status: DISCONTINUED | OUTPATIENT
Start: 2019-07-03 | End: 2019-07-05 | Stop reason: HOSPADM

## 2019-07-05 ENCOUNTER — OFFICE VISIT (OUTPATIENT)
Dept: SURGERY | Facility: CLINIC | Age: 54
End: 2019-07-05

## 2019-07-05 ENCOUNTER — TELEPHONE (OUTPATIENT)
Dept: PAIN MANAGEMENT | Age: 54
End: 2019-07-05

## 2019-07-05 VITALS
TEMPERATURE: 98.7 F | HEART RATE: 80 BPM | DIASTOLIC BLOOD PRESSURE: 90 MMHG | HEIGHT: 64 IN | WEIGHT: 262.8 LBS | OXYGEN SATURATION: 97 % | SYSTOLIC BLOOD PRESSURE: 130 MMHG | BODY MASS INDEX: 44.86 KG/M2

## 2019-07-05 DIAGNOSIS — K46.9 RECURRENT HERNIA: Primary | ICD-10-CM

## 2019-07-05 PROCEDURE — 99212 OFFICE O/P EST SF 10 MIN: CPT | Performed by: SURGERY

## 2019-07-05 NOTE — PROGRESS NOTES
53-year-old female returns after CT scan of her abdomen confirms that she has a recurrent incisional hernia likely at the top edge of her mesh repair from before.  She has omentum present within the hernia and no bile present.  I went over these findings with her and went over the CAT scan with her.      Exam is unchanged     she is still smoking though she is cut down to approximately half pack per day.  Strongly recommend that she quit smoking.  Elective surgery can be done 4 to 6 weeks after she is stop smoking completely.  I went over with her what that means and the importance of that.  She is thought about other ways to bite her time is to the smoking as far as riding a stationary bicycle and walking which I encouraged her to do both.  She will follow-up with us 4 to 6 weeks after she stop smoking or sooner if she has any other symptoms.

## 2019-07-15 DIAGNOSIS — G89.29 CHRONIC BILATERAL LOW BACK PAIN WITH BILATERAL SCIATICA: ICD-10-CM

## 2019-07-15 DIAGNOSIS — M54.42 CHRONIC BILATERAL LOW BACK PAIN WITH BILATERAL SCIATICA: ICD-10-CM

## 2019-07-15 DIAGNOSIS — M54.41 CHRONIC BILATERAL LOW BACK PAIN WITH BILATERAL SCIATICA: ICD-10-CM

## 2019-07-18 RX ORDER — HYDROCODONE BITARTRATE AND ACETAMINOPHEN 7.5; 325 MG/1; MG/1
1 TABLET ORAL EVERY 8 HOURS PRN
Qty: 90 TABLET | Refills: 0 | Status: SHIPPED | OUTPATIENT
Start: 2019-07-18 | End: 2019-08-12 | Stop reason: SDUPTHER

## 2019-08-12 ENCOUNTER — HOSPITAL ENCOUNTER (OUTPATIENT)
Dept: GENERAL RADIOLOGY | Age: 54
Discharge: HOME | End: 2019-08-12
Payer: COMMERCIAL

## 2019-08-12 ENCOUNTER — HOSPITAL ENCOUNTER (OUTPATIENT)
Dept: PAIN MANAGEMENT | Age: 54
Discharge: HOME | End: 2019-08-12
Payer: COMMERCIAL

## 2019-08-12 VITALS
HEIGHT: 64 IN | SYSTOLIC BLOOD PRESSURE: 135 MMHG | TEMPERATURE: 96.9 F | WEIGHT: 264 LBS | DIASTOLIC BLOOD PRESSURE: 94 MMHG | BODY MASS INDEX: 45.07 KG/M2 | RESPIRATION RATE: 18 BRPM | HEART RATE: 97 BPM | OXYGEN SATURATION: 97 %

## 2019-08-12 DIAGNOSIS — M54.41 CHRONIC BILATERAL LOW BACK PAIN WITH BILATERAL SCIATICA: ICD-10-CM

## 2019-08-12 DIAGNOSIS — G89.29 CHRONIC BILATERAL LOW BACK PAIN WITH BILATERAL SCIATICA: ICD-10-CM

## 2019-08-12 DIAGNOSIS — G89.29 CHRONIC BILATERAL LOW BACK PAIN WITH BILATERAL SCIATICA: Primary | ICD-10-CM

## 2019-08-12 DIAGNOSIS — M54.42 CHRONIC BILATERAL LOW BACK PAIN WITH BILATERAL SCIATICA: ICD-10-CM

## 2019-08-12 DIAGNOSIS — M54.41 CHRONIC BILATERAL LOW BACK PAIN WITH BILATERAL SCIATICA: Primary | ICD-10-CM

## 2019-08-12 DIAGNOSIS — M54.42 CHRONIC BILATERAL LOW BACK PAIN WITH BILATERAL SCIATICA: Primary | ICD-10-CM

## 2019-08-12 PROCEDURE — 99214 OFFICE O/P EST MOD 30 MIN: CPT

## 2019-08-12 PROCEDURE — 73521 X-RAY EXAM HIPS BI 2 VIEWS: CPT

## 2019-08-12 PROCEDURE — 99214 OFFICE O/P EST MOD 30 MIN: CPT | Performed by: NURSE PRACTITIONER

## 2019-08-12 PROCEDURE — 72110 X-RAY EXAM L-2 SPINE 4/>VWS: CPT

## 2019-08-12 RX ORDER — HYDROCODONE BITARTRATE AND ACETAMINOPHEN 7.5; 325 MG/1; MG/1
1 TABLET ORAL EVERY 8 HOURS PRN
Qty: 90 TABLET | Refills: 0 | Status: SHIPPED | OUTPATIENT
Start: 2019-08-16 | End: 2019-09-18 | Stop reason: SDUPTHER

## 2019-08-12 ASSESSMENT — ENCOUNTER SYMPTOMS
BACK PAIN: 1
CONSTIPATION: 0
ABDOMINAL PAIN: 1

## 2019-08-12 ASSESSMENT — PAIN SCALES - GENERAL: PAINLEVEL_OUTOF10: 6

## 2019-08-12 ASSESSMENT — PAIN DESCRIPTION - ORIENTATION: ORIENTATION: MID;LOWER

## 2019-08-12 ASSESSMENT — PAIN DESCRIPTION - DESCRIPTORS: DESCRIPTORS: ACHING;CONSTANT;SHARP;TINGLING

## 2019-08-12 ASSESSMENT — PAIN DESCRIPTION - PROGRESSION: CLINICAL_PROGRESSION: NOT CHANGED

## 2019-08-12 ASSESSMENT — PAIN - FUNCTIONAL ASSESSMENT: PAIN_FUNCTIONAL_ASSESSMENT: PREVENTS OR INTERFERES SOME ACTIVE ACTIVITIES AND ADLS

## 2019-08-12 ASSESSMENT — PAIN DESCRIPTION - LOCATION: LOCATION: BACK

## 2019-08-12 ASSESSMENT — ACTIVITIES OF DAILY LIVING (ADL): EFFECT OF PAIN ON DAILY ACTIVITIES: LIMITS ACTIVITY

## 2019-08-12 ASSESSMENT — PAIN DESCRIPTION - DIRECTION: RADIATING_TOWARDS: INTO LEFT LOWER EXTREMITY

## 2019-08-12 ASSESSMENT — PAIN DESCRIPTION - ONSET: ONSET: ON-GOING

## 2019-08-12 ASSESSMENT — PAIN DESCRIPTION - PAIN TYPE: TYPE: CHRONIC PAIN

## 2019-08-12 ASSESSMENT — PAIN DESCRIPTION - FREQUENCY: FREQUENCY: CONTINUOUS

## 2019-08-12 NOTE — PROGRESS NOTES
[facility] Physical & Pain Medicine  Office Note    Patient Name: Sully Pantoja    MR #: [id number]    Account #: [de-identified]    : 1965    Age: 48 y.o. Sex: female    Date: 19    PCP: Milena Chapa    Chief Complaint:   Chief Complaint   Patient presents with    Lower Back Pain       History of Present Illness:     Sully Pantoja is a 48 y.o. female who presents to the office for follow-up of left SI, left trochanteric bursa and left piriformis on 7/3/19. Patient states that she did not receive any relief from injection. Patient stated that approximately 2 weeks after injection she started having severe muscle cramps to left lower extremity. Patient stated that pain radiated from back down the lateral aspect of her leg to the knee and shin every area and then wrapped around to the top of her foot. Patient states that she had severe cramping that caused her foot to ache. States that back pain worsened. She stated that lower back cramped as if she was having menstrual cramps. Patient states that she is not had any imaging of her back and hips in several years. Patient did undergo physical therapy in April at [facility] in [facility]. Of note: Patient is having abdominal surgery for hernia repair tomorrow. Patient informed to call office if she receives any pain medication from physician. Patient instructed that it is preferred that surgeon order pain medication for surgical pain. Patient understands that if pain medicine as ordered from surgeon that our medication is to be put on hold and that it will be resumed once released from surgeon. Past Visit HPI:  19  presents to the office for follow up after having: Left SI Joint Injection on 3/6/19: 70% relief for about 3 days, she reports she is still receiving about 40% relief at this time     Patient has had a flare up with her arthritis. Patient was two weeks behind on her embrel injection.  She had increase in her pain. Patient has been using ice and heat to treat pain. This has effected her greatly in her ADL ability.     Patients last urine drug screen on 2/5/2019 was negative for tizanidine. Patient does not take routinely     2/5/19  presents to the office for follow up physical therapy. Patient reports PT was not effective with low back pain. Patient was given show lift for leg length difference. This has helped with patient \"tripping\"     Patient stated that this past weekend she started having pain in her right hip after pulling her grandson in a wagon.      12/10/18  presented to the office on referral with primary complaints of lower back pain that has been present for many years. Patient also has diagnosis of RA/psoriatic arthritis and left ankle, right knee and left shoulder give her the most problems with that. Previously, patient was being treated by [address] in [place], [place] and more recently transferred to [facility] location with the same clinic.       Patient has taken gabapentin in the past that caused her to feel drugged. Patient states she can not tell a difference between the Mobic and Aleve.      Back Pain   This is a chronic problem. The current episode started more than 1 year ago. The problem occurs constantly. The problem has been gradually worsening since onset. The pain is present in the lumbar spine and sacro-iliac. The quality of the pain is described as aching and cramping. Radiates to: left leg more than right. The pain is at a severity of 5/10. The symptoms are aggravated by twisting (driving for long periods, after standing long periods patient has worsening back pain that is relieved by sitting, however after sitting then she has worse pain standing. ). Associated symptoms include leg pain, numbness, paresthesias and perianal numbness. Pertinent negatives include no bladder incontinence, bowel incontinence or weakness.  She has tried chiropractic manipulation,

## 2019-08-14 ENCOUNTER — TELEPHONE (OUTPATIENT)
Dept: PAIN MANAGEMENT | Age: 54
End: 2019-08-14

## 2019-08-14 NOTE — TELEPHONE ENCOUNTER
----- Message from Sherry Body, APRN - CNP sent at 8/13/2019  1:05 PM CDT -----  Bilateral Hips and Pelvis were unremarkable. Facet arthritis of L4/L5 and L5/S1 with disc degeneration. Patient having surgery today. Call tomorrow. Will follow up once released from Surgeon as planned. Nurse to notify patient.

## 2019-08-15 ENCOUNTER — TELEPHONE (OUTPATIENT)
Dept: PAIN MANAGEMENT | Age: 54
End: 2019-08-15

## 2019-09-18 DIAGNOSIS — M54.42 CHRONIC BILATERAL LOW BACK PAIN WITH BILATERAL SCIATICA: ICD-10-CM

## 2019-09-18 DIAGNOSIS — G89.29 CHRONIC BILATERAL LOW BACK PAIN WITH BILATERAL SCIATICA: ICD-10-CM

## 2019-09-18 DIAGNOSIS — M54.41 CHRONIC BILATERAL LOW BACK PAIN WITH BILATERAL SCIATICA: ICD-10-CM

## 2019-09-19 RX ORDER — HYDROCODONE BITARTRATE AND ACETAMINOPHEN 7.5; 325 MG/1; MG/1
1 TABLET ORAL EVERY 8 HOURS PRN
Qty: 90 TABLET | Refills: 0 | Status: SHIPPED | OUTPATIENT
Start: 2019-09-19 | End: 2019-10-16 | Stop reason: SDUPTHER

## 2019-10-16 DIAGNOSIS — G89.29 CHRONIC BILATERAL LOW BACK PAIN WITH BILATERAL SCIATICA: ICD-10-CM

## 2019-10-16 DIAGNOSIS — M54.42 CHRONIC BILATERAL LOW BACK PAIN WITH BILATERAL SCIATICA: ICD-10-CM

## 2019-10-16 DIAGNOSIS — M54.41 CHRONIC BILATERAL LOW BACK PAIN WITH BILATERAL SCIATICA: ICD-10-CM

## 2019-10-17 RX ORDER — HYDROCODONE BITARTRATE AND ACETAMINOPHEN 7.5; 325 MG/1; MG/1
1 TABLET ORAL EVERY 8 HOURS PRN
Qty: 90 TABLET | Refills: 0 | Status: SHIPPED | OUTPATIENT
Start: 2019-10-19 | End: 2019-11-19 | Stop reason: SDUPTHER

## 2019-11-19 ENCOUNTER — HOSPITAL ENCOUNTER (OUTPATIENT)
Dept: PAIN MANAGEMENT | Age: 54
Discharge: HOME | End: 2019-11-19
Payer: MEDICAID

## 2019-11-19 VITALS
DIASTOLIC BLOOD PRESSURE: 92 MMHG | RESPIRATION RATE: 18 BRPM | OXYGEN SATURATION: 95 % | HEART RATE: 81 BPM | HEIGHT: 64 IN | SYSTOLIC BLOOD PRESSURE: 131 MMHG | TEMPERATURE: 98.7 F | BODY MASS INDEX: 45.24 KG/M2 | WEIGHT: 265 LBS

## 2019-11-19 DIAGNOSIS — M54.42 CHRONIC BILATERAL LOW BACK PAIN WITH BILATERAL SCIATICA: ICD-10-CM

## 2019-11-19 DIAGNOSIS — G89.29 CHRONIC BILATERAL LOW BACK PAIN WITH BILATERAL SCIATICA: ICD-10-CM

## 2019-11-19 DIAGNOSIS — M54.41 CHRONIC BILATERAL LOW BACK PAIN WITH BILATERAL SCIATICA: ICD-10-CM

## 2019-11-19 PROCEDURE — 99213 OFFICE O/P EST LOW 20 MIN: CPT

## 2019-11-19 PROCEDURE — 99213 OFFICE O/P EST LOW 20 MIN: CPT | Performed by: NURSE PRACTITIONER

## 2019-11-19 RX ORDER — HYDROCODONE BITARTRATE AND ACETAMINOPHEN 7.5; 325 MG/1; MG/1
1 TABLET ORAL EVERY 8 HOURS PRN
Qty: 90 TABLET | Refills: 0 | Status: SHIPPED | OUTPATIENT
Start: 2019-11-19 | End: 2020-01-13 | Stop reason: SDUPTHER

## 2019-11-19 RX ORDER — HYDROCODONE BITARTRATE AND ACETAMINOPHEN 7.5; 325 MG/1; MG/1
1 TABLET ORAL EVERY 8 HOURS PRN
Qty: 90 TABLET | Refills: 0 | Status: SHIPPED | OUTPATIENT
Start: 2019-11-19 | End: 2020-01-13

## 2019-11-19 ASSESSMENT — PAIN DESCRIPTION - ORIENTATION: ORIENTATION: LOWER

## 2019-11-19 ASSESSMENT — PAIN SCALES - GENERAL: PAINLEVEL_OUTOF10: 5

## 2019-11-19 ASSESSMENT — PAIN DESCRIPTION - PAIN TYPE: TYPE: CHRONIC PAIN

## 2019-11-19 ASSESSMENT — ENCOUNTER SYMPTOMS
BACK PAIN: 1
CONSTIPATION: 0

## 2019-11-19 ASSESSMENT — PAIN - FUNCTIONAL ASSESSMENT: PAIN_FUNCTIONAL_ASSESSMENT: PREVENTS OR INTERFERES SOME ACTIVE ACTIVITIES AND ADLS

## 2019-11-19 ASSESSMENT — PAIN DESCRIPTION - ONSET: ONSET: ON-GOING

## 2019-11-19 ASSESSMENT — ACTIVITIES OF DAILY LIVING (ADL): EFFECT OF PAIN ON DAILY ACTIVITIES: LIMITS ACTIVITY

## 2019-11-19 ASSESSMENT — PAIN DESCRIPTION - LOCATION: LOCATION: BACK

## 2019-11-19 ASSESSMENT — PAIN DESCRIPTION - PROGRESSION: CLINICAL_PROGRESSION: GRADUALLY WORSENING

## 2019-11-19 ASSESSMENT — PAIN DESCRIPTION - FREQUENCY: FREQUENCY: CONTINUOUS

## 2020-01-14 RX ORDER — HYDROCODONE BITARTRATE AND ACETAMINOPHEN 7.5; 325 MG/1; MG/1
1 TABLET ORAL EVERY 8 HOURS PRN
Qty: 90 TABLET | Refills: 0 | Status: SHIPPED | OUTPATIENT
Start: 2020-01-18 | End: 2020-02-17 | Stop reason: SDUPTHER

## 2020-02-04 ENCOUNTER — OFFICE VISIT (OUTPATIENT)
Dept: SLEEP MEDICINE | Facility: HOSPITAL | Age: 55
End: 2020-02-04

## 2020-02-04 VITALS
HEIGHT: 64 IN | OXYGEN SATURATION: 98 % | DIASTOLIC BLOOD PRESSURE: 92 MMHG | BODY MASS INDEX: 45.7 KG/M2 | SYSTOLIC BLOOD PRESSURE: 151 MMHG | WEIGHT: 267.7 LBS | HEART RATE: 64 BPM

## 2020-02-04 DIAGNOSIS — F51.04 PSYCHOPHYSIOLOGICAL INSOMNIA: ICD-10-CM

## 2020-02-04 DIAGNOSIS — G47.33 OBSTRUCTIVE SLEEP APNEA, ADULT: Primary | ICD-10-CM

## 2020-02-04 DIAGNOSIS — E66.01 MORBIDLY OBESE (HCC): ICD-10-CM

## 2020-02-04 PROCEDURE — 99203 OFFICE O/P NEW LOW 30 MIN: CPT | Performed by: INTERNAL MEDICINE

## 2020-02-04 NOTE — PROGRESS NOTES
New Patient Sleep Medicine Consultation    Encounter Date: 2/4/2020         Patient's PCP: Tess Hancock APRN  Referring provider: No ref. provider found  Reason for consultation chief complaint: recently diagnosed MANDY at outlying facility She had HST at [facility] on 09/17/2019 - AHI of 44.3    Emiliana Carlos is a 54 y.o. female whose bedtime is ~ 2200. She  falls asleep after 120 minutes, and is up 1-2 times per night. She wakes up ~ 8030-0951. She endorses 6-8 hours of sleep. She drinks 1 cups of coffee, 0 teas, and 4 sodas per day. She drinks 0 alcoholic beverages per week.    Emiliana Carlos admits to snoring, unrestful sleep, High blod pressure, gasping during sleep, excessive daytime sleepiness, morning headaches, frequent unexplained arousals, irritability, Disturbed or restless sleep, Up to the bathroom at night and difficulty falling asleep. She denies cataplexy, sleep paralysis, or hypnagogic hallucinations. She does not take sedatives or hypnotics. She has some sleepiness with driving. She naps when unstimulated.    She used to smoke but recently quit. Smoking history: smoked 1.5 ppds from age 20 until 53, now on e-cig    Past Medical History:   Diagnosis Date   • Acute bronchitis    • Backache    • Breast cyst    • Breast lump     left breast 4 mm mass 4 oclock benign on bx    • Chest pain    • Foot pain    • Gastroesophageal reflux disease    • Generalized anxiety disorder    • History of ectopic pregnancy    • History of measles    • History of polycystic ovaries    • History of varicella    • Inflammation of foot joint    • Muscle strain     Strain of muscle of trunk   • Neck pain    • Osteoarthritis of foot joint    • Otitis media    • Pain in limb    • Peroneal tendinitis    • Plantar fasciitis    • Psoriasis    • Rheumatoid arthritis (CMS/HCC)    • Tendinitis of ankle     Tendinitis AND/OR tenosynovitis of the ankle region - EDL   • Tobacco dependence syndrome    • Upper  "respiratory infection      Social History     Socioeconomic History   • Marital status:      Spouse name: Not on file   • Number of children: Not on file   • Years of education: Not on file   • Highest education level: Not on file   Tobacco Use   • Smoking status: Current Every Day Smoker     Packs/day: 1.00   • Smokeless tobacco: Never Used   Substance and Sexual Activity   • Alcohol use: No     Frequency: Never   • Drug use: Defer   • Sexual activity: Defer     Family History   Problem Relation Age of Onset   • Breast cancer Mother    , dating   1 child  Retired deli supervisor  Working on disability from arthritis (Rheumatoid and Psoriatic)  3 brothers and 0 sisters  Other family history + for: HTN  FH of sleep disorders: none known    [place] - 10    Review of Systems:  Constitutional: negative  Eyes: negative  Ears, nose, mouth, throat, and face: negative  Respiratory: negative  Cardiovascular: negative  Gastrointestinal: negative  Genitourinary:negative  Integument/breast: negative  Hematologic/lymphatic: negative  Musculoskeletal:positive for arthralgias and stiff joints  Neurological: negative  Behavioral/Psych: negative  Endocrine: negative  Allergic/Immunologic: negative Patient advised to discuss any positive ROS with PCP.      Vitals:    02/04/20 1042   BP: 151/92   Pulse: 64   SpO2: 98%           02/04/20  1042   Weight: 121 kg (267 lb 11.2 oz)       Body mass index is 45.93 kg/m². Patient's Body mass index is 45.93 kg/m². BMI is above normal parameters. Recommendations include: referral to primary care.  Neck circumference: 16\"          General: Alert. Cooperative. Well developed. No acute distress.             Head:  Normocephalic. Symmetrical. Atraumatic.              Eyes: Sclera clear. No icterus. PERRLA. Normal EOM.             Ears: No deformities. Normal hearing.             Nose: No septal deviation. No drainage.          Throat: No oral lesions. No thrush. Moist mucous " membranes. Trachea midline    Tongue is normal    Dentition is good       Pharynx: Posterior pharyngeal pillars are narrow    Mallampati score of IV (only hard palate visible)    Pharynx is normal with unrermarkable tonsils   Chest Wall:  Normal shape. Symmetric expansion with respiration. No tenderness.          Lungs:  Clear to auscultation bilaterally. No wheezes. No rhonchi. No rales. Respirations regular, even and unlabored.            Heart:  Regular rhythm and normal rate. Normal S1 and S2. No murmur.     Abdomen:  Soft, non-tender and non-distended. Normal bowel sounds. No masses.  Extremities:  Moves all extremities well. No edema.           Pulses: Pulses palpable and equal bilaterally.               Skin: Dry. Intact. No bleeding. No rash.           Neuro: Moves all 4 extremities and cranial nerves grossly intact.  Psychiatric: Normal mood and affect.      Current Outpatient Medications:   •  albuterol sulfate  (90 Base) MCG/ACT inhaler, inhale 2 puff by inhalation route  every 4 - 6 hours as needed, Disp: , Rfl:   •  escitalopram (LEXAPRO) 10 MG tablet, Take 10 mg by mouth daily., Disp: , Rfl:   •  Etanercept (ENBREL) 25 MG reconstituted solution, Inject  under the skin., Disp: , Rfl:   •  HYDROcodone-acetaminophen (NORCO) 7.5-325 MG per tablet, Take 1 tablet by mouth Every 6 (Six) Hours As Needed for Moderate Pain ., Disp: , Rfl:   •  lisinopril (PRINIVIL,ZESTRIL) 5 MG tablet, TAKE ONE TABLET BY MOUTH ONCE DAILY, Disp: , Rfl: 5  •  methotrexate 2.5 MG tablet, Take  by mouth 3 (three) doses each week. Take doses 12 (twelve) hours apart from each other., Disp: , Rfl:   •  omeprazole (prilOSEC) 10 MG capsule, Take 10-20 mg by mouth., Disp: , Rfl:   •  raNITIdine (WAL-YOCASTA MAXIMUM STRENGTH) 150 MG tablet, take 1 tablet by oral route  twice daily, Disp: , Rfl:   •  TiZANidine (ZANAFLEX) 4 MG capsule, take 1/4 capsule by oral route  every day, Disp: , Rfl:     Lab Results   Component Value Date    WBC  8.5 05/19/2015    HGB 14.1 05/19/2015    HCT 42.3 05/19/2015    MCV 81.2 05/19/2015     05/19/2015     Lab Results   Component Value Date    GLUCOSE 80 04/17/2014    CALCIUM 9.5 04/17/2014     04/17/2014    K 4.1 04/17/2014    CO2 24 04/17/2014     04/17/2014    BUN 19 04/17/2014    CREATININE 0.9 04/17/2014    ANIONGAP 14.0 04/17/2014     No results found for: INR, PROTIME  No results found for: CKTOTAL, CKMB, CKMBINDEX, TROPONINI, TROPONINT    No results found for: PHART, CZO7TXY, PO2ART]    Assessment and Plan:    1. Obstructive sleep apnea - stable chronic illnesses and New (to me), additional work-up planned (4)   1. Script for autocpap 5-20 cm H2O   2. RTC in 31-90 days with compliance check  2. Insomnia - sleep onset and or maintenance -  stable chronic illness and New (to me), additional work-up planned (4)  1. CBT-I  riccardo    Patient to follow up in 31-90 days with compliance report         This document has been electronically signed by Cameron Morse MD on February 4, 2020         CC: Tess Hancock APRN          No ref. provider found

## 2020-02-17 ENCOUNTER — HOSPITAL ENCOUNTER (OUTPATIENT)
Dept: PAIN MANAGEMENT | Age: 55
Discharge: HOME | End: 2020-02-17
Payer: MEDICAID

## 2020-02-17 VITALS
HEART RATE: 82 BPM | WEIGHT: 266.13 LBS | BODY MASS INDEX: 45.43 KG/M2 | DIASTOLIC BLOOD PRESSURE: 94 MMHG | HEIGHT: 64 IN | TEMPERATURE: 97.4 F | SYSTOLIC BLOOD PRESSURE: 135 MMHG | OXYGEN SATURATION: 97 % | RESPIRATION RATE: 18 BRPM

## 2020-02-17 PROBLEM — G57.03 PIRIFORMIS SYNDROME OF BOTH SIDES: Status: ACTIVE | Noted: 2020-02-17

## 2020-02-17 PROCEDURE — 99214 OFFICE O/P EST MOD 30 MIN: CPT | Performed by: NURSE PRACTITIONER

## 2020-02-17 PROCEDURE — 99215 OFFICE O/P EST HI 40 MIN: CPT

## 2020-02-17 RX ORDER — TRAZODONE HYDROCHLORIDE 50 MG/1
50 TABLET ORAL NIGHTLY
COMMUNITY
End: 2021-02-22

## 2020-02-17 RX ORDER — HYDROCODONE BITARTRATE AND ACETAMINOPHEN 7.5; 325 MG/1; MG/1
1 TABLET ORAL EVERY 8 HOURS PRN
Qty: 90 TABLET | Refills: 0 | Status: SHIPPED | OUTPATIENT
Start: 2020-02-18 | End: 2020-03-16 | Stop reason: SDUPTHER

## 2020-02-17 ASSESSMENT — PAIN DESCRIPTION - LOCATION: LOCATION: BACK;HIP

## 2020-02-17 ASSESSMENT — ENCOUNTER SYMPTOMS
CONSTIPATION: 0
BACK PAIN: 1
BOWEL INCONTINENCE: 0

## 2020-02-17 ASSESSMENT — PAIN SCALES - GENERAL: PAINLEVEL_OUTOF10: 5

## 2020-02-17 ASSESSMENT — PAIN DESCRIPTION - PAIN TYPE: TYPE: CHRONIC PAIN

## 2020-02-17 ASSESSMENT — PAIN DESCRIPTION - ORIENTATION: ORIENTATION: LOWER;RIGHT

## 2020-02-17 NOTE — H&P
Sodium (ALEVE) 220 MG CAPS Take by mouth as needed for Pain      tiZANidine (ZANAFLEX) 4 MG tablet 1/4 tablet with meals 1/2 to whole tablet at bedtime 60 tablet 1     No current facility-administered medications for this encounter. Social History    Social History     Socioeconomic History    Marital status:      Spouse name: None    Number of children: None    Years of education: None    Highest education level: None   Occupational History    None   Social Needs    Financial resource strain: None    Food insecurity:     Worry: None     Inability: None    Transportation needs:     Medical: None     Non-medical: None   Tobacco Use    Smoking status: Former Smoker     Packs/day: 1.00     Types: Cigarettes     Last attempt to quit: 2019     Years since quittin.6    Smokeless tobacco: Never Used   Substance and Sexual Activity    Alcohol use: No    Drug use: No    Sexual activity: None   Lifestyle    Physical activity:     Days per week: None     Minutes per session: None    Stress: None   Relationships    Social connections:     Talks on phone: None     Gets together: None     Attends Sabianist service: None     Active member of club or organization: None     Attends meetings of clubs or organizations: None     Relationship status: None    Intimate partner violence:     Fear of current or ex partner: None     Emotionally abused: None     Physically abused: None     Forced sexual activity: None   Other Topics Concern    None   Social History Narrative    None         Family History  family history is not on file. Review of Systems:  Review of Systems   Constitutional: Negative for activity change. Gastrointestinal: Negative for bowel incontinence and constipation. Genitourinary: Negative for bladder incontinence. Musculoskeletal: Positive for arthralgias, back pain and myalgias. Negative for neck pain. Neurological: Negative for weakness and numbness. the appointment for 2/18/2020  4. Follow-up in 3 months or sooner if needed for piriformis injections    Discussion: Discussed exam findings and plan of care with patient. Patient agreed with POC and questions were asked and answered. Activity: discussed exercise as beneficial to pain reduction, encouraged stretching exercise with a focus on torso strengthening. Goals:  Pain Management Goals of Therapy:   [] Resolution in pain  [x] Decrease in pain level  [x] Improvement in ADL's  [x] Increase in activities with less pain  [] Decrease in medication      Controlled substance monitoring:    [x] Discussed medication side effects, risk of tolerance and/or dependence, and/or alternative treatment  [] Discussed the detrimental effects of long term narcotic use in younger patients especially women of childbearing years. [x] No signs and symptoms of potential drug abuse or diversion were identified  [] Signs of potential drug abuse or diversion were identified   [] ORT Score   [] UDS non-compliant   [] See Note  [x] Random urine drug screen sent today 2/17/2020  [] Random urine drug screen not completed today   [] Deferred New Patient  [] Compliant   [] Not Compliant see note  [x] Medication agreement with provider signed today 2/17/2020  [] Medication agreement with provider on file under media   [x] Medication regimen effective and continued   [] New patient continuing current medication while developing POC   [] On going assessment and evaluation of medication regimen  [] Medication regimen not effective see plan for changes  [x] Sharda Cesar reviewed & on file under media  [id number]    CC:  Emeka Hyatt, APRN - CNP, 2/17/2020 at 10:01 AM    EMR dragon/transcription disclaimer: Much of this encounter note is electronic transcription/translation of spoken language to printed tach.  Electronic translation of spoken language may be erroneous, or at times, nonsensical words or phrases may be inadvertently

## 2020-02-17 NOTE — PROGRESS NOTES
Thinner Request Form  [] Obtained Test Results / Consult Notes  [] UDS due at next visit, verified per EPIC      [] Suspected Physical Abuse or Suicide Risk assessed - IF YES COMPLETE QUESTIONS BELOW    If any of the following questions are answered yes - contact attending physician for referral:    Has been considering harming self to escape stress, pain problems? [] YES  [x] NO  Has a suicide plan? [] YES  [x] NO  Has attempted suicide in the past?   [] YES  [x] NO  Has a close friend or family member who committed suicide?   [] YES  [x] NO    Assessment Completed by:  Electronically signed by Alesia Schroeder RN on 2/17/2020 at 8:34 AM

## 2020-03-17 RX ORDER — HYDROCODONE BITARTRATE AND ACETAMINOPHEN 7.5; 325 MG/1; MG/1
1 TABLET ORAL EVERY 8 HOURS PRN
Qty: 90 TABLET | Refills: 0 | Status: SHIPPED | OUTPATIENT
Start: 2020-03-19 | End: 2020-04-14 | Stop reason: SDUPTHER

## 2020-04-14 RX ORDER — HYDROCODONE BITARTRATE AND ACETAMINOPHEN 7.5; 325 MG/1; MG/1
1 TABLET ORAL EVERY 8 HOURS PRN
Qty: 90 TABLET | Refills: 0 | Status: SHIPPED | OUTPATIENT
Start: 2020-04-18 | End: 2020-05-13 | Stop reason: SDUPTHER

## 2020-04-22 ENCOUNTER — OFFICE VISIT (OUTPATIENT)
Dept: SLEEP MEDICINE | Facility: HOSPITAL | Age: 55
End: 2020-04-22

## 2020-04-22 DIAGNOSIS — G47.33 OBSTRUCTIVE SLEEP APNEA, ADULT: Primary | ICD-10-CM

## 2020-04-22 PROCEDURE — 99213 OFFICE O/P EST LOW 20 MIN: CPT | Performed by: INTERNAL MEDICINE

## 2020-04-22 NOTE — PROGRESS NOTES
Sleep Clinic Telephonic Follow Up    Date: 2020  Primary Care Physician: Tess Hancock APRN    Last office visit: 2020 (I reviewed the note from this day for accuracy and anything.  Any portions or data from that note that have been used in this note have been checked for accuracy and the appropriate changes have been made to this note wherever necessary.)    CC: Follow up: MADNY      This visit was conducted by telephone given the recent COVID-19 pandemic. Consent was given by the patient to conduct this visit by telephone         Sleep Testin. HST at [facility] on 2019 - AHI of 44.3  2. Currently on 5-20 cm H2O    Assessment and Plan:    1. Obstructive sleep apnea - stable chronic illness and Established, stable (1)  1. Compliant and improved with PAP therapy  2. Increase psi to  10-20 cmH2O  3. Script for PAP supplies    Interim History:  Since the last visit:    1) severe MANDY -  Emiliana Carlos has remained compliant with CPAP. She denies mask and machine issues, dry mouth, headaches, or pressures intolerance. She denies abnormal dreams, sleep paralysis, nasal congestion, URI sx.    PAP Data:    Time frame: 2020 - 2020   Compliance 100 %  Average use on days used: 7hrs 7 min  Percent of days with usage greater than or equal to 4 hours: 100%  PAP range : 5-20 cm H2O  Average 90% pressure: 10.5 cmH2O  Leak: 4 minutes  Average AHI 4.0 events/hr  Mask type: Full face mask  DME: [facility]    Bed time: 2200  Sleep latency: 10-15 minutes  Number of times awakens during the night: 0-1  Wake time: 7965-1266  Estimated total sleep time at night: 6-8 hours  Caffeine intake: minimal   Alcohol intake: 0 drinks per week  Nap time: none   Sleepiness with Driving: none    [place] - 5    2) Patient denies RLS symptoms.     PMHx, FH, SH reviewed and pertinent changes are: unchanged from last office visit (date above)      REVIEW OF SYSTEMS:   Negative for chest pain, fever, cough,  SOA, abdominal pain.       Exam:  There were no vitals filed for this visit.    Gen:  No acute distress heard invoice, alert, oriented    Past Medical History:   Diagnosis Date   • Acute bronchitis    • Backache    • Breast cyst    • Breast lump     left breast 4 mm mass 4 oclock benign on bx    • Chest pain    • Foot pain    • Gastroesophageal reflux disease    • Generalized anxiety disorder    • History of ectopic pregnancy    • History of measles    • History of polycystic ovaries    • History of varicella    • Inflammation of foot joint    • Muscle strain     Strain of muscle of trunk   • Neck pain    • Osteoarthritis of foot joint    • Otitis media    • Pain in limb    • Peroneal tendinitis    • Plantar fasciitis    • Psoriasis    • Rheumatoid arthritis (CMS/HCC)    • Tendinitis of ankle     Tendinitis AND/OR tenosynovitis of the ankle region - EDL   • Tobacco dependence syndrome    • Upper respiratory infection        Current Outpatient Medications:   •  albuterol sulfate  (90 Base) MCG/ACT inhaler, inhale 2 puff by inhalation route  every 4 - 6 hours as needed, Disp: , Rfl:   •  escitalopram (LEXAPRO) 10 MG tablet, Take 10 mg by mouth daily., Disp: , Rfl:   •  Etanercept (ENBREL) 25 MG reconstituted solution, Inject  under the skin., Disp: , Rfl:   •  HYDROcodone-acetaminophen (NORCO) 7.5-325 MG per tablet, Take 1 tablet by mouth Every 6 (Six) Hours As Needed for Moderate Pain ., Disp: , Rfl:   •  lisinopril (PRINIVIL,ZESTRIL) 5 MG tablet, TAKE ONE TABLET BY MOUTH ONCE DAILY, Disp: , Rfl: 5  •  methotrexate 2.5 MG tablet, Take  by mouth 3 (three) doses each week. Take doses 12 (twelve) hours apart from each other., Disp: , Rfl:   •  omeprazole (prilOSEC) 10 MG capsule, Take 10-20 mg by mouth., Disp: , Rfl:   •  raNITIdine (WAL-YOCASTA MAXIMUM STRENGTH) 150 MG tablet, take 1 tablet by oral route  twice daily, Disp: , Rfl:   •  TiZANidine (ZANAFLEX) 4 MG capsule, take 1/4 capsule by oral route  every day,  Disp: , Rfl:     I obtained a brief history from the patient, reviewed the medical problems and current medications, and made medical decisions regarding treatment based on that information. Total visit time 20 min, with total of 15 minutes spent counseling patient regarding:  PAP therapy and replacing mask if comes off in the middle of the night.        RTC in 12 months     This document has been electronically signed by Cameron Morse MD on April 22, 2020         CC: Tess Hancock, APRN          No ref. provider found

## 2020-05-14 RX ORDER — HYDROCODONE BITARTRATE AND ACETAMINOPHEN 7.5; 325 MG/1; MG/1
1 TABLET ORAL EVERY 8 HOURS PRN
Qty: 90 TABLET | Refills: 0 | Status: SHIPPED | OUTPATIENT
Start: 2020-05-18 | End: 2020-05-26 | Stop reason: SDUPTHER

## 2020-05-26 ENCOUNTER — HOSPITAL ENCOUNTER (OUTPATIENT)
Dept: PAIN MANAGEMENT | Age: 55
Discharge: HOME | End: 2020-05-26
Payer: MEDICAID

## 2020-05-26 VITALS
WEIGHT: 252.25 LBS | RESPIRATION RATE: 18 BRPM | SYSTOLIC BLOOD PRESSURE: 135 MMHG | HEIGHT: 64 IN | BODY MASS INDEX: 43.07 KG/M2 | OXYGEN SATURATION: 95 % | DIASTOLIC BLOOD PRESSURE: 89 MMHG | HEART RATE: 79 BPM | TEMPERATURE: 97.3 F

## 2020-05-26 PROCEDURE — 99214 OFFICE O/P EST MOD 30 MIN: CPT | Performed by: NURSE PRACTITIONER

## 2020-05-26 PROCEDURE — 99213 OFFICE O/P EST LOW 20 MIN: CPT

## 2020-05-26 RX ORDER — TIZANIDINE 4 MG/1
TABLET ORAL
Qty: 60 TABLET | Refills: 2 | Status: SHIPPED | OUTPATIENT
Start: 2020-05-26 | End: 2021-05-24 | Stop reason: ALTCHOICE

## 2020-05-26 RX ORDER — FERROUS SULFATE 325(65) MG
325 TABLET ORAL
COMMUNITY
Start: 2020-05-19 | End: 2020-11-05

## 2020-05-26 RX ORDER — HYDROCODONE BITARTRATE AND ACETAMINOPHEN 7.5; 325 MG/1; MG/1
1 TABLET ORAL EVERY 8 HOURS PRN
Qty: 90 TABLET | Refills: 0 | Status: SHIPPED | OUTPATIENT
Start: 2020-06-17 | End: 2020-07-13 | Stop reason: SDUPTHER

## 2020-05-26 RX ORDER — PANTOPRAZOLE SODIUM 40 MG/1
40 TABLET, DELAYED RELEASE ORAL
COMMUNITY
Start: 2020-03-25

## 2020-05-26 RX ORDER — VARENICLINE TARTRATE
KIT
COMMUNITY
Start: 2020-05-20 | End: 2021-02-22

## 2020-05-26 ASSESSMENT — ENCOUNTER SYMPTOMS
BOWEL INCONTINENCE: 0
CONSTIPATION: 0
BACK PAIN: 1

## 2020-05-26 ASSESSMENT — PAIN DESCRIPTION - LOCATION: LOCATION: BACK;HIP

## 2020-05-26 ASSESSMENT — PAIN DESCRIPTION - PAIN TYPE: TYPE: CHRONIC PAIN

## 2020-05-26 ASSESSMENT — PAIN DESCRIPTION - ORIENTATION: ORIENTATION: LOWER;RIGHT

## 2020-05-26 ASSESSMENT — PAIN SCALES - GENERAL: PAINLEVEL_OUTOF10: 5

## 2020-05-26 NOTE — PROGRESS NOTES
Prescription Ordered/Changed [] Blood Thinner Request Form  [] Obtained Test Results / Consult Notes  [] UDS due at next visit, verified per EPIC      [] Suspected Physical Abuse or Suicide Risk assessed - IF YES COMPLETE QUESTIONS BELOW    If any of the following questions are answered yes - contact attending physician for referral:    Has been considering harming self to escape stress, pain problems? [] YES  [] NO  Has a suicide plan? [] YES  [] NO  Has attempted suicide in the past?   [] YES  [] NO  Has a close friend or family member who committed suicide?   [] YES  [] NO    Assessment Completed by:  Electronically signed by Rafael Banuelos RN on 5/26/2020 at 8:22 AM

## 2020-06-03 ENCOUNTER — HOSPITAL ENCOUNTER (OUTPATIENT)
Dept: PAIN MANAGEMENT | Age: 55
Discharge: HOME | End: 2020-06-03
Payer: MEDICAID

## 2020-06-03 VITALS
TEMPERATURE: 98.1 F | HEART RATE: 83 BPM | BODY MASS INDEX: 43.02 KG/M2 | SYSTOLIC BLOOD PRESSURE: 131 MMHG | HEIGHT: 64 IN | RESPIRATION RATE: 18 BRPM | WEIGHT: 252 LBS | DIASTOLIC BLOOD PRESSURE: 83 MMHG | OXYGEN SATURATION: 96 %

## 2020-06-03 PROCEDURE — 20553 NJX 1/MLT TRIGGER POINTS 3/>: CPT | Performed by: NURSE PRACTITIONER

## 2020-06-03 PROCEDURE — 20553 NJX 1/MLT TRIGGER POINTS 3/>: CPT

## 2020-06-03 PROCEDURE — 2500000003 HC RX 250 WO HCPCS

## 2020-06-03 RX ORDER — LIDOCAINE HYDROCHLORIDE 10 MG/ML
10 INJECTION, SOLUTION EPIDURAL; INFILTRATION; INTRACAUDAL; PERINEURAL ONCE
Status: DISCONTINUED | OUTPATIENT
Start: 2020-06-03 | End: 2020-06-05 | Stop reason: HOSPADM

## 2020-06-03 RX ORDER — BUPIVACAINE HYDROCHLORIDE 5 MG/ML
10 INJECTION, SOLUTION EPIDURAL; INTRACAUDAL ONCE
Status: DISCONTINUED | OUTPATIENT
Start: 2020-06-03 | End: 2020-06-05 | Stop reason: HOSPADM

## 2020-06-03 ASSESSMENT — PAIN DESCRIPTION - PAIN TYPE: TYPE: CHRONIC PAIN

## 2020-06-03 ASSESSMENT — PAIN DESCRIPTION - ORIENTATION: ORIENTATION: RIGHT

## 2020-06-03 ASSESSMENT — PAIN DESCRIPTION - DESCRIPTORS: DESCRIPTORS: CONSTANT

## 2020-06-03 ASSESSMENT — PAIN SCALES - GENERAL: PAINLEVEL_OUTOF10: 5

## 2020-06-03 ASSESSMENT — PAIN DESCRIPTION - PROGRESSION: CLINICAL_PROGRESSION: GRADUALLY WORSENING

## 2020-06-03 ASSESSMENT — PAIN DESCRIPTION - LOCATION: LOCATION: HIP;ANKLE

## 2020-06-03 ASSESSMENT — PAIN DESCRIPTION - FREQUENCY: FREQUENCY: CONTINUOUS

## 2020-06-03 ASSESSMENT — PAIN DESCRIPTION - DIRECTION: RADIATING_TOWARDS: LEFT ANKLE

## 2020-06-03 ASSESSMENT — PAIN DESCRIPTION - ONSET: ONSET: AWAKENED FROM SLEEP

## 2020-06-03 ASSESSMENT — PAIN - FUNCTIONAL ASSESSMENT: PAIN_FUNCTIONAL_ASSESSMENT: PREVENTS OR INTERFERES SOME ACTIVE ACTIVITIES AND ADLS

## 2020-06-03 NOTE — PROCEDURES
[address] [place] [address] Physical & Pain Medicine    Patient Name: Rebecca Werner    : 1965                    Age: 48 y.o. Sex: female    Date: Giulia 3, 2020    Pre-op Diagnosis: Myofascial Pain/ Muscle Spasms    Post-op Diagnosis: Myofascial Pain/ Muscle Spasms    Procedure: Piriformis Trigger Point Injections    Performing Procedure: GHASSAN Sears - BC, VA-BC    Patient Vitals for the past 24 hrs:   BP Temp Temp src Pulse Resp SpO2 Height Weight   20 0851 131/83 98.1 °F (36.7 °C) Temporal 83 18 96 % 5' 4\" (1.626 m) 252 lb (114.3 kg)     Right  Piriformis Trigger Point Injections    Description of Procedure:    After a brief physical assessment and failure to improve with conservative measures the patient presented for Piriformis Trigger Point Injections The indications, limitations and possible complications were discussed with the patient and the patient elected to proceed with the procedure. After voluntary, informed and signed consent obtained the patient was placed in a  right Lateral Recumbent Position     Appropriate time out was obtained per policy. The area of maximal tenderness was palpated over the on right Gluteus minimus,  Gluteus Bryant, and Piriformis. The skin overlying these areas was marked. The areas were prepped using aseptic technique CHG prep. The proposed injection sites were the sprayed with Gebauer's Solution while protecting patient eyes. Each trigger point of the muscles - Gluteus minimus,  Gluteus Bryant, and Piriformis was injected with approximately 1-2 ml of a solution of 5 ml of 1% Lidocaine Plain and 5 ml of 0.5% Marcaine Plain after negative aspiration. IF this was a bilateral procedure, the patient was rolled over to the opposite side and the same steps were followed. Discharge: The patient tolerated the procedure well.  There were no complications during the procedure and the patient was discharged home with discharge instructions. The patient has been instructed to contact the office should there be any complications or questions to arise between today and their next appointment. Plan:   Will return to the office in  6 weeks for Procedure Follow-up  and Office Visit      EDU Mancini CNP, 6/3/2020 at 9:39 AM

## 2020-06-11 ENCOUNTER — TELEPHONE (OUTPATIENT)
Dept: PAIN MANAGEMENT | Age: 55
End: 2020-06-11

## 2020-07-15 RX ORDER — HYDROCODONE BITARTRATE AND ACETAMINOPHEN 7.5; 325 MG/1; MG/1
1 TABLET ORAL EVERY 8 HOURS PRN
Qty: 90 TABLET | Refills: 0 | Status: SHIPPED | OUTPATIENT
Start: 2020-07-17 | End: 2020-07-30 | Stop reason: SDUPTHER

## 2020-07-30 ENCOUNTER — HOSPITAL ENCOUNTER (OUTPATIENT)
Dept: PAIN MANAGEMENT | Age: 55
Discharge: HOME | End: 2020-07-30
Payer: MEDICAID

## 2020-07-30 ENCOUNTER — HOSPITAL ENCOUNTER (OUTPATIENT)
Dept: GENERAL RADIOLOGY | Age: 55
Discharge: HOME | End: 2020-07-30
Payer: MEDICAID

## 2020-07-30 VITALS
WEIGHT: 249.13 LBS | HEIGHT: 64 IN | BODY MASS INDEX: 42.53 KG/M2 | SYSTOLIC BLOOD PRESSURE: 119 MMHG | HEART RATE: 74 BPM | DIASTOLIC BLOOD PRESSURE: 81 MMHG | TEMPERATURE: 98.3 F | OXYGEN SATURATION: 96 %

## 2020-07-30 PROBLEM — Z02.89 PAIN MANAGEMENT CONTRACT AGREEMENT: Status: ACTIVE | Noted: 2020-07-30

## 2020-07-30 PROCEDURE — 73521 X-RAY EXAM HIPS BI 2 VIEWS: CPT

## 2020-07-30 PROCEDURE — 99214 OFFICE O/P EST MOD 30 MIN: CPT | Performed by: NURSE PRACTITIONER

## 2020-07-30 PROCEDURE — 99214 OFFICE O/P EST MOD 30 MIN: CPT

## 2020-07-30 RX ORDER — HYDROCODONE BITARTRATE AND ACETAMINOPHEN 7.5; 325 MG/1; MG/1
1 TABLET ORAL EVERY 8 HOURS PRN
Qty: 90 TABLET | Refills: 0 | Status: SHIPPED | OUTPATIENT
Start: 2020-08-15 | End: 2020-09-14 | Stop reason: SDUPTHER

## 2020-07-30 RX ORDER — MELOXICAM 7.5 MG/1
7.5 TABLET ORAL DAILY
Qty: 30 TABLET | Refills: 2 | Status: SHIPPED | OUTPATIENT
Start: 2020-07-30 | End: 2020-11-05

## 2020-07-30 ASSESSMENT — PAIN DESCRIPTION - LOCATION: LOCATION: HIP;KNEE

## 2020-07-30 ASSESSMENT — PAIN DESCRIPTION - ORIENTATION: ORIENTATION: RIGHT

## 2020-07-30 ASSESSMENT — PAIN SCALES - GENERAL: PAINLEVEL_OUTOF10: 5

## 2020-07-30 ASSESSMENT — ENCOUNTER SYMPTOMS
CONSTIPATION: 0
BACK PAIN: 1
BOWEL INCONTINENCE: 0

## 2020-07-30 ASSESSMENT — PAIN DESCRIPTION - PAIN TYPE: TYPE: CHRONIC PAIN

## 2020-07-30 NOTE — PROGRESS NOTES
Nursing Admission Record    Current Issues / Falls / ER Visits: Follow up procedure    Was Percentage of Pain Relief after Right Piriformis Trigger Point Injections on 6/3/2020:  60 %    How long lasted:  6 weeks Yes  Were you able to decrease pain medication after treatment? Yes  Were you able to increase activity after treatment? Yes  Did you have any adverse reactions to treatment? No    Opioids Prescribed: Norco 7.5mg Q8HRS PRN #90    Was Medication Brought to Appt: Yes     Hx of any Neck/Back Surgeries? None     Is Patient currently taking a blood thinner? None     MRI exams received in the past 2 years:  No       When na                                              Where na       Imaging on chart: No         Imaging records requested: No     CT exam received during the last 12 months: Yes CT Abdomen/Pelvis       When 6/2019                                              Where [facility]       Imaging on chart: Yes         Imaging records requested: No     X-ray exam received during the last 12 months: Yes XR Bilateral Hips/Pelvis and XR Lumbar Spine       When 8/2019                                              Where [facility]       Imaging on chart: Yes         Imaging records requested: No     Nerve Conduction Study/EMG:  No       When na                                              Where na       Imaging on chart: No         Imaging records requested:  No     Physical therapy during the last 6 months: No       When: na                                               Where  na     Behavior Health No       When: na                                               Where na     Labs  Yes       When: [facility]    PEG Score: 5.3    Last PEG Score: 5.3    Annual ORT Score: 1    Annual PHQ Score: 7    Last UDS Results: 2/17/2020 compliant    Education Provided:  [x] Review of Zafar Hernandez  [] Agreement Review  [x] PEG Score Calculated [] PHQ Score Calculated [] ORT Score Calculated    [] Compliance Issues Discussed [] Cognitive Behavior Needs [x] Exercise [] Review of Test [] Financial Issues  [x] Tobacco/Alcohol Use Reviewed [x] Teaching [] New Patient [] Picture Obtained    Physician Plan:  [] Outgoing Referral  [] Pharmacy Consult  [x] Test Ordered [x] Prescription Ordered/Changed [] Blood Thinner Request Form  [] Obtained Test Results / Consult Notes  [] UDS due at next visit, verified per EPIC      [] Suspected Physical Abuse or Suicide Risk assessed - IF YES COMPLETE QUESTIONS BELOW    If any of the following questions are answered yes - contact attending physician for referral:    Has been considering harming self to escape stress, pain problems? [] YES  [] NO  Has a suicide plan? [] YES  [] NO  Has attempted suicide in the past?   [] YES  [] NO  Has a close friend or family member who committed suicide?   [] YES  [] NO    Assessment Completed by:  Electronically signed by Lily Raman RN on 7/30/2020 at 8:16 AM

## 2020-07-30 NOTE — PROGRESS NOTES
[facility] Physical & Pain Medicine  Office Note    Patient Name: Blane Epley    MR #: [id number]    Account #: [de-identified]    : 1965    Age: 48 y.o. Sex: female    Date: 2020    PCP: Rashel Waters    Chief Complaint:   Chief Complaint   Patient presents with    Knee Pain     Right    Hip Pain     Right       History of Present Illness:     Blane Epley is a 48 y.o. female who presents for office visit and follow up of injections. Patient stated that injection did help but she felt pain was deeper in her butt/hip area. Patient has been having more joint pain lately and she is to see Rheum in August. Patient did have a few days of intense low back pain on the left side after bending over to put groceries in her refrigerator. Patient stated that she used heat/ice and muscle relaxers. Pain resolved in a few days. Back Pain   This is a chronic problem. The current episode started more than 1 year ago. The problem occurs constantly. The problem has been waxing and waning since onset. The pain is present in the gluteal, lumbar spine and sacro-iliac. The quality of the pain is described as cramping and aching. Radiates to: deep into buttocks on right. The symptoms are aggravated by sitting and standing (walking ). Associated symptoms include leg pain. Pertinent negatives include no bladder incontinence, bowel incontinence, numbness or weakness. Last Procedure since last office visit:     Was Percentage of Pain Relief after Right Piriformis Trigger Point Injections on 6/3/2020:  60 %    How long lasted:  6 weeks Yes  Were you able to decrease pain medication after treatment? Yes  Were you able to increase activity after treatment? Yes  Did you have any adverse reactions to treatment?  No    Screening Tools:    PEG Score: 5.3     Last PEG Score: 5.3     Annual ORT Score: 1     Annual PHQ Score: 7    Current Pain Assessment  Pain Assessment  Pain Assessment: 0-10  Pain Level: 5  Pain Type: Chronic pain  Pain Location: Hip, Knee  Pain Orientation: Right    Past Visit HPI:  5/2020  presents to the office for 3 month follow up. Patient continues to have pain in her right buttocks. The pain is getting worse especially with ambulation. Patient states that she has to go to bed and do stretches when pain gets severe. Patient has been doing exercises and taking muscle relaxer's. Patient denies any SE from medications. Back Pain is a chronic problem. The current episode started more than 1 year ago. The problem occurs constantly. The problem has been gradually worsening since onset. The pain is present in the lumbar spine, sacro-iliac and gluteal. The quality of the pain is described as aching and cramping. Radiates to: deep into buttocks on right. The symptoms are aggravated by sitting and standing (walking ). Associated symptoms include leg pain. Pertinent negatives include no bladder incontinence, bowel incontinence, numbness or weakness. Annual Exam  2/17/2020  who presents to the office for annual exam with primary complaints of chronic low back pain. Patient has been established with this office since December 10, 2018. Patient is undergone left SI, greater trochanteric bursa and piriformis injections. Patient states that she is starting to have pain on the right side right in the center of her buttocks. She continues to do home exercises.      Back Pain is a chronic problem. The current episode started more than 1 year ago. The problem occurs constantly. The problem has been gradually worsening since onset. The pain is present in the lumbar spine and sacro-iliac. The quality of the pain is described as aching and cramping. Radiates to: deep into buttocks on right. The symptoms are aggravated by sitting and standing (walking ). Associated symptoms include leg pain. Pertinent negatives include no bladder incontinence, bowel incontinence, numbness or weakness.       Past Medical Review of Systems:  Review of Systems   Constitutional: Negative for activity change. Gastrointestinal: Negative for bowel incontinence and constipation. Genitourinary: Negative for bladder incontinence. Musculoskeletal: Positive for arthralgias, back pain and myalgias. Negative for neck pain. Neurological: Negative for weakness and numbness. Paresthesias. Psychiatric/Behavioral: Negative for agitation, self-injury and suicidal ideas. The patient is not nervous/anxious. 14 point ROS negative besides that noted in HPI    Physical Exam:    Vitals:    07/30/20 0827   BP: 119/81   Pulse: 74   Temp: 98.3 °F (36.8 °C)   SpO2: 96%   Weight: 249 lb 2 oz (113 kg)   Height: 5' 4\" (1.626 m)       Body mass index is 42.76 kg/m². Physical Exam  Vitals signs and nursing note reviewed. Constitutional:       General: She is not in acute distress. Appearance: She is well-developed. HENT:      Head: Normocephalic. Right Ear: External ear normal.      Left Ear: External ear normal.      Nose: Nose normal.   Eyes:      Conjunctiva/sclera: Conjunctivae normal.      Pupils: Pupils are equal, round, and reactive to light. Neck:      Musculoskeletal: Normal range of motion and neck supple. Cardiovascular:      Rate and Rhythm: Normal rate. Pulmonary:      Effort: Pulmonary effort is normal.   Abdominal:      General: Bowel sounds are normal.      Palpations: Abdomen is soft. Musculoskeletal:      Right knee: She exhibits decreased range of motion. Tenderness found. Left knee: She exhibits decreased range of motion. Tenderness found. Lumbar back: She exhibits decreased range of motion, pain and spasm. Skin:     General: Skin is warm and dry. Neurological:      Mental Status: She is alert and oriented to person, place, and time. Cranial Nerves: No cranial nerve deficit. Psychiatric:         Behavior: Behavior normal.         Thought Content:  Thought content normal. Judgment: Judgment normal.       MRI exams received in the past 2 years:  No       When na                                              Where na       Imaging on chart: No         Imaging records requested: No     CT exam received during the last 12 months: Yes CT Abdomen/Pelvis       When 6/2019                                              Where [facility]       Imaging on chart: Yes         Imaging records requested: No     X-ray exam received during the last 12 months: Yes XR Bilateral Hips/Pelvis and XR Lumbar Spine       When 8/2019                                              Where [facility]       Imaging on chart: Yes         Imaging records requested: No     Nerve Conduction Study/EMG:  No       When na                                              Where na       Imaging on chart: No         Imaging records requested: No     Physical therapy during the last 6 months: No       When: na                                               Where  na     Behavior Health No       When: na                                               Where na     Labs  Yes       When: 5                                             Where [address]    Most recent imaging, testing, and response to counseling and/or rehabilitation were reviewed with patient. [x] Yes  [] No    Labs:  No results found for: NA, K, CL, CO2, BUN, CREATININE, GLUCOSE, CALCIUM     No results found for: WBC, HGB, HCT, MCV, PLT    Assessment:  Principal Problem:    Chronic bilateral low back pain with bilateral sciatica  Active Problems:    Sacroiliitis (HCC)    Arthritis    Trochanteric bursitis of left hip    Chronic myofascial pain    Piriformis syndrome of both sides    Pain management contract agreement  Resolved Problems:    * No resolved hospital problems. *      Plan:  1. Obtain xray of bilateral hips and pelvis. Patient has not had imaging in several years. Patient states that she feels pain is deep in her hip.  Will assess for hip arthritis May need hip injection. 2. Start meloxicam. Patient has not been on prescribed NSAIDs for arthritis pain. Patient has been using OTC medications Patient is to follow up with Rheum in August.   3. Continue Norco 7.5 mg every 8 hours # 90 will prescription sent at appointment for fill date 9/15/2020  4. Continue zanaflex with no refill needed at this time. Patient to continue stretches. 5. Will post pone injections until next appointment    Discussion: Discussed exam findings and plan of care with patient. Patient agreed with POC and questions were asked and answered. Activity: Discussed exercise as beneficial to pain reduction, encouraged daily stretching with a focus on torso strengthening. Goal:  Pain Management Goals of Therapy:   [] Resolution in pain  [x] Decrease in pain level  [x] Improvement in ADL's  [x] Increase in activities with less pain  [] Decrease in medication      Controlled substance monitoring:    [x] Discussed medication side effects, risk of tolerance and/or dependence, and/or alternative treatment  [] Discussed the detrimental effects of long term narcotic use in younger patients especially women of childbearing years.   [x] No signs and symptoms of potential drug abuse or diversion were identified  [] Signs of potential drug abuse or diversion were identified   [] ORT Score   [] UDS non-compliant   [] See Note  [] Random urine drug screen sent today  [x] Random urine drug screen not completed today   [] Deferred New Patient  [x] Compliant  2/17/2020  [] Not Compliant see note  [] Medication agreement with provider signed today  [x] Medication agreement with provider on file under media 2/17/2020  [x] Medication regimen effective and continued   [] New patient continuing current medication while developing POC   [] On going assessment and evaluation of medication regimen  [] Medication regimen not effective see plan for changes  [x] Tony Iglesias reviewed & on file under media CC:  EDU Hinds - CNP, 7/30/2020 at 8:35 AM    EMR dragon/transcription disclaimer: Much of this encounter note is electronic transcription/translation of spoken language to printed tach. Electronic translation of spoken language may be erroneous, or at times, nonsensical words or phrases may be inadvertently transcribed.  Although, I have reviewed the note for such errors, some may still exist.

## 2020-08-24 ENCOUNTER — TELEPHONE (OUTPATIENT)
Dept: PAIN MANAGEMENT | Age: 55
End: 2020-08-24

## 2020-08-24 NOTE — TELEPHONE ENCOUNTER
----- Message from EDU Armando CNP sent at 8/3/2020  3:27 PM CDT -----  Patient has mild loss of joint space.  Please notify patient

## 2020-09-16 RX ORDER — HYDROCODONE BITARTRATE AND ACETAMINOPHEN 7.5; 325 MG/1; MG/1
1 TABLET ORAL EVERY 8 HOURS PRN
Qty: 90 TABLET | Refills: 0 | Status: SHIPPED | OUTPATIENT
Start: 2020-09-16 | End: 2020-10-12 | Stop reason: SDUPTHER

## 2020-10-14 RX ORDER — HYDROCODONE BITARTRATE AND ACETAMINOPHEN 7.5; 325 MG/1; MG/1
1 TABLET ORAL EVERY 8 HOURS PRN
Qty: 90 TABLET | Refills: 0 | Status: SHIPPED | OUTPATIENT
Start: 2020-10-16 | End: 2021-02-22

## 2020-11-05 ENCOUNTER — HOSPITAL ENCOUNTER (OUTPATIENT)
Dept: PAIN MANAGEMENT | Age: 55
Discharge: HOME | End: 2020-11-05
Payer: MEDICAID

## 2020-11-05 VITALS
WEIGHT: 252.2 LBS | SYSTOLIC BLOOD PRESSURE: 103 MMHG | HEIGHT: 64 IN | HEART RATE: 69 BPM | OXYGEN SATURATION: 95 % | DIASTOLIC BLOOD PRESSURE: 72 MMHG | BODY MASS INDEX: 43.06 KG/M2 | TEMPERATURE: 98.9 F

## 2020-11-05 PROCEDURE — 99214 OFFICE O/P EST MOD 30 MIN: CPT

## 2020-11-05 PROCEDURE — 99213 OFFICE O/P EST LOW 20 MIN: CPT | Performed by: NURSE PRACTITIONER

## 2020-11-05 PROCEDURE — 99213 OFFICE O/P EST LOW 20 MIN: CPT

## 2020-11-05 RX ORDER — HYDROCODONE BITARTRATE AND ACETAMINOPHEN 7.5; 325 MG/1; MG/1
1 TABLET ORAL EVERY 8 HOURS PRN
Qty: 90 TABLET | Refills: 0 | Status: SHIPPED | OUTPATIENT
Start: 2020-11-16 | End: 2020-12-10 | Stop reason: SDUPTHER

## 2020-11-05 ASSESSMENT — PAIN DESCRIPTION - LOCATION: LOCATION: BACK;HIP

## 2020-11-05 ASSESSMENT — ENCOUNTER SYMPTOMS
CONSTIPATION: 0
BACK PAIN: 1
BOWEL INCONTINENCE: 0

## 2020-11-05 ASSESSMENT — PAIN DESCRIPTION - PAIN TYPE: TYPE: CHRONIC PAIN

## 2020-11-05 ASSESSMENT — PAIN SCALES - GENERAL: PAINLEVEL_OUTOF10: 5

## 2020-11-05 NOTE — PROGRESS NOTES
[facility] Physical & Pain Medicine  Office Note    Patient Name: Lito Eddy    MR #: [id number]    Account #: [de-identified]    : 1965    Age: 50 y.o. Sex: female    Date: 2020    PCP: Demar Camacho    Chief Complaint:   Chief Complaint   Patient presents with    Lower Back Pain    Hip Pain       History of Present Illness:     Lito Eddy is a 50 y.o. female who presents for review of imaging and office visit. Patient has re-established with a RA doctor. The doctor stopped the meloxicam due to renal function. Patient stated that she has a lot of lab work drawn. Patient is doing better with RA. Patient's x-ray of pelvis indicated narrowing in the hip space. Patient continues to have pain in the right hip/buttocks area that worsens with any prolonged position. Patient stated that she took a muscle relaxer prior to a long trip and her pain was not as severe from riding in the car. Patient is scheduled to have weight loss surgery. Back Pain   This is a chronic problem. The current episode started more than 1 year ago. The problem occurs constantly. The problem has been waxing and waning since onset. The pain is present in the gluteal, lumbar spine and sacro-iliac. The quality of the pain is described as cramping and aching. Radiates to: deep into buttocks on right. The symptoms are aggravated by sitting and standing (walking ). Associated symptoms include leg pain. Pertinent negatives include no bladder incontinence, bowel incontinence, numbness or weakness. Hip Pain   Associated symptoms include arthralgias and myalgias. Pertinent negatives include no neck pain, numbness or weakness. Last Procedure since last office visit:     Was Percentage of Pain Relief after Right Piriformis Trigger Point Injections on 6/3/2020:  60 %    How long lasted:  6 weeks Yes  Were you able to decrease pain medication after treatment?  Yes  Were you able to increase activity after treatment? Yes  Did you have any adverse reactions to treatment? No    Screening Tools:    PEG Score: 5.3     Last PEG Score: 5.3     Annual ORT Score: 1     Annual PHQ Score: 7    Current Pain Assessment  Pain Assessment  Pain Level: 5  Patient's Stated Pain Goal: 3  Pain Type: Chronic pain  Pain Location: Back, Hip    Past Visit HPI:  7/30/2020  presents for office visit and follow up of injections. Patient stated that injection did help but she felt pain was deeper in her butt/hip area. Patient has been having more joint pain lately and she is to see Rheum in August. Patient did have a few days of intense low back pain on the left side after bending over to put groceries in her refrigerator. Patient stated that she used heat/ice and muscle relaxers. Pain resolved in a few days. 5/2020  presents to the office for 3 month follow up. Patient continues to have pain in her right buttocks. The pain is getting worse especially with ambulation. Patient states that she has to go to bed and do stretches when pain gets severe. Patient has been doing exercises and taking muscle relaxer's. Patient denies any SE from medications. Back Pain is a chronic problem. The current episode started more than 1 year ago. The problem occurs constantly. The problem has been gradually worsening since onset. The pain is present in the lumbar spine, sacro-iliac and gluteal. The quality of the pain is described as aching and cramping. Radiates to: deep into buttocks on right. The symptoms are aggravated by sitting and standing (walking ). Associated symptoms include leg pain. Pertinent negatives include no bladder incontinence, bowel incontinence, numbness or weakness. Annual Exam  2/17/2020  who presents to the office for annual exam with primary complaints of chronic low back pain. Patient has been established with this office since December 10, 2018.   Patient is undergone left SI, greater trochanteric bursa and piriformis injections. Patient states that she is starting to have pain on the right side right in the center of her buttocks. She continues to do home exercises.      Back Pain is a chronic problem. The current episode started more than 1 year ago. The problem occurs constantly. The problem has been gradually worsening since onset. The pain is present in the lumbar spine and sacro-iliac. The quality of the pain is described as aching and cramping. Radiates to: deep into buttocks on right. The symptoms are aggravated by sitting and standing (walking ). Associated symptoms include leg pain. Pertinent negatives include no bladder incontinence, bowel incontinence, numbness or weakness. Past Medical History  Past Medical History:   Diagnosis Date    Arthritis     Depression     Hypertension     Indigestion     Rheumatoid arthritis ([facility] [address]        Surgery History  Past Surgical History:   Procedure Laterality Date    HERNIA REPAIR      HYSTERECTOMY      VASCULAR SURGERY      vein stripping        Family History  family history is not on file. Social History    Social History     Tobacco Use    Smoking status: Former Smoker     Packs/day: 1.00     Types: Cigarettes     Last attempt to quit: 2019     Years since quittin.4    Smokeless tobacco: Never Used   Substance Use Topics    Alcohol use: No       Allergies  Codeine     Current Medications  Current Outpatient Medications   Medication Sig Dispense Refill    HYDROcodone-acetaminophen (NORCO) 7.5-325 MG per tablet Take 1 tablet by mouth every 8 hours as needed for Pain for up to 30 days. Intended supply: 30 days 90 tablet 0    HYDROcodone-acetaminophen (NORCO) 7.5-325 MG per tablet Take 1 tablet by mouth every 8 hours as needed for Pain for up to 30 days.  May fill 10/16/20 90 tablet 0    pantoprazole (PROTONIX) 40 MG tablet Take 40 mg by mouth every morning (before breakfast)      CHANTIX STARTING MONTH LORENZO 0.5 MG X 11 & 1 MG X 42 tablet       tiZANidine (ZANAFLEX) 4 MG tablet 1/4 tablet with meals 1/2 to whole tablet at bedtime 60 tablet 2    metFORMIN (GLUCOPHAGE) 850 MG tablet Take 850 mg by mouth daily (with breakfast)      traZODone (DESYREL) 50 MG tablet Take 50 mg by mouth nightly      lisinopril (PRINIVIL;ZESTRIL) 5 MG tablet Take 1 tablet by mouth daily  5    escitalopram (LEXAPRO) 20 MG tablet Take 1 tablet by mouth daily  5    methotrexate (RHEUMATREX) 2.5 MG chemo tablet Take 6 tablets by mouth once a week  0    folic acid (FOLVITE) 1 MG tablet Take 1 tablet by mouth daily  0    ENBREL SURECLICK 50 MG/ML SOAJ Inject 50 mg into the skin once a week  0    Naproxen Sodium (ALEVE) 220 MG CAPS Take by mouth as needed for Pain       No current facility-administered medications for this encounter. Review of Systems:  Review of Systems   Constitutional: Negative for activity change. Gastrointestinal: Negative for bowel incontinence and constipation. Genitourinary: Negative for bladder incontinence. Musculoskeletal: Positive for arthralgias, back pain and myalgias. Negative for neck pain. Neurological: Negative for weakness and numbness. Paresthesias. Psychiatric/Behavioral: Negative for agitation, self-injury and suicidal ideas. The patient is not nervous/anxious. 14 point ROS negative besides that noted in HPI    Physical Exam:    Vitals:    11/05/20 0854 11/05/20 0900   BP:  103/72   Pulse:  69   Temp:  98.9 °F (37.2 °C)   TempSrc:  Temporal   SpO2:  95%   Weight:  252 lb 3.2 oz (114.4 kg)   Height: 5' 4\" (1.626 m) 5' 4\" (1.626 m)       Body mass index is 43.29 kg/m². Physical Exam  Vitals signs and nursing note reviewed. Constitutional:       General: She is not in acute distress. Appearance: She is well-developed. HENT:      Head: Normocephalic.       Right Ear: External ear normal.      Left Ear: External ear normal.      Nose: Nose normal.   Eyes:      Conjunctiva/sclera: Conjunctivae normal. Pupils: Pupils are equal, round, and reactive to light. Neck:      Musculoskeletal: Normal range of motion and neck supple. Cardiovascular:      Rate and Rhythm: Normal rate. Pulmonary:      Effort: Pulmonary effort is normal.   Abdominal:      General: Bowel sounds are normal.      Palpations: Abdomen is soft. Musculoskeletal:      Right knee: She exhibits decreased range of motion. Tenderness found. Left knee: She exhibits decreased range of motion. Tenderness found. Lumbar back: She exhibits decreased range of motion, pain and spasm. Skin:     General: Skin is warm and dry. Neurological:      Mental Status: She is alert and oriented to person, place, and time. Cranial Nerves: No cranial nerve deficit. Psychiatric:         Behavior: Behavior normal.         Thought Content: Thought content normal.         Judgment: Judgment normal.       MRI exams received in the past 2 years:  No       When na                                              Where na       Imaging on chart: No         Imaging records requested: No     CT exam received during the last 12 months: Yes CT Abdomen/Pelvis       When 6/2019                                              Where [facility]       Imaging on chart: Yes         Imaging records requested: No     X-ray exam received during the last 12 months: Yes XR Bilateral Hips/Pelvis and XR Lumbar Spine       When 8/2019                                              Where [facility]       Imaging on chart: Yes         Imaging records requested: No     Nerve Conduction Study/EMG:  No       When na                                              Where na       Imaging on chart: No         Imaging records requested:  No     Physical therapy during the last 6 months: No       When: na                                               Where  na     Behavior Health No       When: na                                               Where na     Labs  Yes       When: [address]                                             [facility] [address]    Most recent imaging, testing, and response to counseling and/or rehabilitation were reviewed with patient. [x] Yes  [] No    Labs:  No results found for: NA, K, CL, CO2, BUN, CREATININE, GLUCOSE, CALCIUM     No results found for: WBC, HGB, HCT, MCV, PLT    Assessment:  Active Problems:    Chronic bilateral low back pain with bilateral sciatica    Sacroiliitis (HCC)    Arthritis    Trochanteric bursitis of left hip    Chronic myofascial pain    Piriformis syndrome of both sides    Pain management contract agreement  Resolved Problems:    * No resolved hospital problems. *      Plan:  1. Chronic bilateral low back pain with bilateral sciatica  - Continue HYDROcodone-acetaminophen (NORCO) 7.5-325 MG per tablet; Take 1 tablet by mouth every 8 hours as needed for Pain for up to 30 days. Intended supply: 30 days  Dispense: 90 tablet; Refill: 0    -Stop Meloxicam due to renal function. Patient to continue to follow up with PCP and new rheumatologist for RA    -Obtained lab results from PCP. Labwork received from PCP and reviewed. GFR was 52, Creatinine 1.1, BUN 19, & CBC WNL    [] Follow up    [] 4 weeks   [] 6-8 weeks   [] 10-12 weeks   [x] 3 months  [] Post procedure to evaluate effectiveness of treatment  [] To evaluate medications changes made at office visit. [] To review diagnostics ordered at last visit. [] To evaluate response to therapy    [x] For management of controlled substance  [x] Random UDS as indicated by ORT score or if indicated by abberent behaviors    Discussion: Discussed exam findings and plan of care with patient. Patient agreed with POC and questions were asked and answered. Activity: Discussed exercise as beneficial to pain reduction, encouraged daily stretching with a focus on torso strengthening.     Goal:  Pain Management Goals of Therapy:   [] Resolution in pain  [x] Decrease in pain level  [x] Improvement in ADL's  [x] Increase in activities with less pain  [] Decrease in medication      Controlled substance monitoring:    [x] Discussed medication side effects, risk of tolerance and/or dependence, and/or alternative treatment  [] Discussed the detrimental effects of long term narcotic use in younger patients especially women of childbearing years. [x] No signs and symptoms of potential drug abuse or diversion were identified  [] Signs of potential drug abuse or diversion were identified   [] ORT Score   [] UDS non-compliant   [] See Note  [] Random urine drug screen sent today  [x] Random urine drug screen not completed today   [] Deferred New Patient  [x] Compliant  2/17/2020  [] Not Compliant see note  [] Medication agreement with provider signed today  [x] Medication agreement with provider on file under media 2/17/2020  [x] Medication regimen effective and continued   [] New patient continuing current medication while developing POC   [] On going assessment and evaluation of medication regimen  [] Medication regimen not effective see plan for changes  [x] Margarito Frankel reviewed & on file under media       CC:  EDU Babb CNP, 12/7/2020 at 4:47 PM    EMR dragon/transcription disclaimer: Much of this encounter note is electronic transcription/translation of spoken language to printed tach. Electronic translation of spoken language may be erroneous, or at times, nonsensical words or phrases may be inadvertently transcribed.  Although, I have reviewed the note for such errors, some may still exist.

## 2020-11-05 NOTE — PROGRESS NOTES
Intake Documentation    Reason for visit: Xray results of bilateral hip and pelvis done 7/30/20. F/u meloxicam .    ED visits since last seen: no    Any falls since last seen: no    Have you had any neck or back surgeries: no    Have you had injections in the past: yes    I  PEG Score: 5    Last PEG Score: 5.3    Annual ORT Score: 1    Annual PHQ Score: 7    Last UDS Results: 2/17/20 compliant    Education Provided:  [x] Review of Mike  [] Agreement Review  [x] PEG Score Calculated [] PHQ Score Calculated [] ORT Score Calculated    [] Compliance Issues Discussed [] Cognitive Behavior Needs [x] Exercise [] Review of Test [] Financial Issues  [x] Tobacco/Alcohol Use Reviewed [x] Teaching [] New Patient [] Picture Obtained    Physician Plan:  [] Outgoing Referral  [x] Pharmacy Consult  [] Test Ordered [] Prescription Ordered/Changed [] Blood Thinner Request Form  [x] Obtained Test Results / Consult Notes  [] UDS due at next visit, verified per EPIC      [] Suspected Physical Abuse or Suicide Risk assessed - IF YES COMPLETE QUESTIONS BELOW    If any of the following questions are answered yes - contact attending physician for referral:    Has been considering harming self to escape stress, pain problems? [] YES  [] NO  Has a suicide plan? [] YES  [] NO  Has attempted suicide in the past?   [] YES  [] NO  Has a close friend or family member who committed suicide?   [] YES  [] NO    Assessment Completed by:  Electronically signed by Abiodnu Moise on 11/5/2020 at 8:19 AM

## 2020-12-10 RX ORDER — HYDROCODONE BITARTRATE AND ACETAMINOPHEN 7.5; 325 MG/1; MG/1
1 TABLET ORAL EVERY 8 HOURS PRN
Qty: 90 TABLET | Refills: 0 | Status: SHIPPED | OUTPATIENT
Start: 2020-12-16 | End: 2021-01-11 | Stop reason: SDUPTHER

## 2021-01-11 DIAGNOSIS — M54.41 CHRONIC BILATERAL LOW BACK PAIN WITH BILATERAL SCIATICA: ICD-10-CM

## 2021-01-11 DIAGNOSIS — M54.42 CHRONIC BILATERAL LOW BACK PAIN WITH BILATERAL SCIATICA: ICD-10-CM

## 2021-01-11 DIAGNOSIS — G89.29 CHRONIC BILATERAL LOW BACK PAIN WITH BILATERAL SCIATICA: ICD-10-CM

## 2021-01-12 RX ORDER — HYDROCODONE BITARTRATE AND ACETAMINOPHEN 7.5; 325 MG/1; MG/1
1 TABLET ORAL EVERY 8 HOURS PRN
Qty: 90 TABLET | Refills: 0 | Status: SHIPPED | OUTPATIENT
Start: 2021-01-15 | End: 2021-02-17 | Stop reason: SDUPTHER

## 2021-02-17 DIAGNOSIS — M54.42 CHRONIC BILATERAL LOW BACK PAIN WITH BILATERAL SCIATICA: ICD-10-CM

## 2021-02-17 DIAGNOSIS — M54.41 CHRONIC BILATERAL LOW BACK PAIN WITH BILATERAL SCIATICA: ICD-10-CM

## 2021-02-17 DIAGNOSIS — G89.29 CHRONIC BILATERAL LOW BACK PAIN WITH BILATERAL SCIATICA: ICD-10-CM

## 2021-02-17 RX ORDER — HYDROCODONE BITARTRATE AND ACETAMINOPHEN 7.5; 325 MG/1; MG/1
TABLET ORAL
Qty: 90 TABLET | Refills: 0 | OUTPATIENT
Start: 2021-02-17

## 2021-02-17 RX ORDER — HYDROCODONE BITARTRATE AND ACETAMINOPHEN 7.5; 325 MG/1; MG/1
1 TABLET ORAL EVERY 8 HOURS PRN
Qty: 90 TABLET | Refills: 0 | Status: SHIPPED | OUTPATIENT
Start: 2021-02-17 | End: 2021-03-15 | Stop reason: SDUPTHER

## 2021-02-22 ENCOUNTER — HOSPITAL ENCOUNTER (OUTPATIENT)
Dept: PAIN MANAGEMENT | Age: 56
Discharge: HOME | End: 2021-02-22
Payer: MEDICAID

## 2021-02-22 VITALS
HEIGHT: 64 IN | DIASTOLIC BLOOD PRESSURE: 87 MMHG | SYSTOLIC BLOOD PRESSURE: 137 MMHG | TEMPERATURE: 98.9 F | OXYGEN SATURATION: 94 % | RESPIRATION RATE: 16 BRPM | WEIGHT: 218 LBS | BODY MASS INDEX: 37.22 KG/M2 | HEART RATE: 59 BPM

## 2021-02-22 PROCEDURE — G0463 HOSPITAL OUTPT CLINIC VISIT: HCPCS

## 2021-02-22 PROCEDURE — 99215 OFFICE O/P EST HI 40 MIN: CPT

## 2021-02-22 PROCEDURE — 99213 OFFICE O/P EST LOW 20 MIN: CPT | Performed by: NURSE PRACTITIONER

## 2021-02-22 ASSESSMENT — ENCOUNTER SYMPTOMS
BACK PAIN: 1
BOWEL INCONTINENCE: 0
CONSTIPATION: 0

## 2021-02-22 ASSESSMENT — PAIN DESCRIPTION - LOCATION: LOCATION: BACK

## 2021-02-22 ASSESSMENT — PAIN SCALES - GENERAL: PAINLEVEL_OUTOF10: 4

## 2021-02-22 NOTE — H&P
[facility]    Annual History and Physical    Patient Name: Wes Sandoval    MR #: [id number]    Account [de-identified]    : 1965    Age: 50 y.o. Sex: female    Date: 2021    PCP: Marisa Queen    Chief Complaint:   Chief Complaint   Patient presents with    Back Pain       History of Present Illness: The patient is a 50 y.o. female who presents to the office for annual exam with primary complaints of low back. Patient has been established with this office in 2018. Patient has undergone various injections such as left SI, left greater trochanteric bursa and left piriformis. Patient had x-ray of bilateral hips and pelvis 2020 which indicated mild loss of joint base bilaterally and no acute bony abnormality. Patient had x-ray of lumbar spine in 2019 which indicated facet arthritis and L4-L5 L5-S1 with mild disc narrowing and L4-L5 and L5-S1. Patient had gastric bypass in 2021. Patient is doing well since surgery. Back Pain  This is a chronic problem. The current episode started more than 1 year ago. The problem occurs constantly. The problem has been waxing and waning since onset. The pain is present in the lumbar spine and sacro-iliac. The quality of the pain is described as shooting and aching (shooting pain on left side). Radiates to: bilateral hips. The symptoms are aggravated by bending, sitting, standing and stress (walking makes left side worse). Associated symptoms include numbness (left) and tingling (left). Pertinent negatives include no bladder incontinence, bowel incontinence or weakness.        Screening Tools:    PE.3    Past PE.3    ORT: 1    PHQ-9: 2    Current Pain Assessment  Pain Assessment  Pain Assessment: 0-10  Pain Level: 4  Pain Type: Chronic pain  Pain Location: Back  Pain Orientation: Lower, Mid, Left      Past Medical History  Past Medical History:   Diagnosis Date    Arthritis     Depression     Hypertension     Indigestion     Rheumatoid arthritis (HCC)        Allergies  Codeine    Current Medications  Current Outpatient Medications   Medication Sig Dispense Refill    HYDROcodone-acetaminophen (NORCO) 7.5-325 MG per tablet Take 1 tablet by mouth every 8 hours as needed for Pain for up to 30 days. 90 tablet 0    pantoprazole (PROTONIX) 40 MG tablet Take 40 mg by mouth every morning (before breakfast)      tiZANidine (ZANAFLEX) 4 MG tablet 1/4 tablet with meals 1/2 to whole tablet at bedtime 60 tablet 2    escitalopram (LEXAPRO) 20 MG tablet Take 1 tablet by mouth daily  5    methotrexate (RHEUMATREX) 2.5 MG chemo tablet Take 6 tablets by mouth once a week  0    folic acid (FOLVITE) 1 MG tablet Take 1 tablet by mouth daily  0    ENBREL SURECLICK 50 MG/ML SOAJ Inject 50 mg into the skin once a week  0    Naproxen Sodium (ALEVE) 220 MG CAPS Take by mouth as needed for Pain       No current facility-administered medications for this encounter.         Social History    Social History     Socioeconomic History    Marital status:      Spouse name: None    Number of children: None    Years of education: None    Highest education level: None   Occupational History    None   Social Needs    Financial resource strain: None    Food insecurity     Worry: None     Inability: None    Transportation needs     Medical: None     Non-medical: None   Tobacco Use    Smoking status: Former Smoker     Packs/day: 1.00     Types: Cigarettes     Quit date: 2019     Years since quittin.6    Smokeless tobacco: Never Used   Substance and Sexual Activity    Alcohol use: No    Drug use: No    Sexual activity: None   Lifestyle    Physical activity     Days per week: None     Minutes per session: None    Stress: None   Relationships    Social connections     Talks on phone: None     Gets together: None     Attends [employer] service: None     Active member of club or organization: None     Attends meetings of clubs or organizations: None     Relationship status: None    Intimate partner violence     Fear of current or ex partner: None     Emotionally abused: None     Physically abused: None     Forced sexual activity: None   Other Topics Concern    None   Social History Narrative    None         Family History  family history is not on file. Review of Systems:  Review of Systems   Constitutional: Negative for activity change. Gastrointestinal: Negative for bowel incontinence and constipation. Genitourinary: Negative for bladder incontinence. Musculoskeletal: Positive for arthralgias, back pain and myalgias. Negative for neck pain. Neurological: Positive for tingling (left) and numbness (left). Negative for weakness. Psychiatric/Behavioral: Negative for agitation, self-injury and suicidal ideas. The patient is not nervous/anxious. 14 point ROS negative besides that noted in HPI    Physical exam:     Patient Vitals for the past 24 hrs:   BP Temp Temp src Pulse Resp SpO2 Height Weight   02/22/21 0844 137/87 98.9 °F (37.2 °C) Temporal 59 16 94 % 5' 4\" (1.626 m) 218 lb (98.9 kg)       Body mass index is 37.42 kg/m². Physical Exam  Vitals signs and nursing note reviewed. Constitutional:       General: She is not in acute distress. Appearance: She is well-developed. HENT:      Head: Normocephalic. Right Ear: External ear normal.      Left Ear: External ear normal.      Nose: Nose normal.   Eyes:      Conjunctiva/sclera: Conjunctivae normal.      Pupils: Pupils are equal, round, and reactive to light. Neck:      Vascular: No JVD. Trachea: No tracheal deviation. Cardiovascular:      Rate and Rhythm: Normal rate. Pulmonary:      Effort: Pulmonary effort is normal.   Abdominal:      General: There is no distension. Tenderness: There is no abdominal tenderness.    Musculoskeletal:      Lumbar back: She exhibits decreased range of motion, tenderness, pain and spasm. Comments: + Left SI assessment   Skin:     General: Skin is warm and dry. Neurological:      Mental Status: She is alert and oriented to person, place, and time. Cranial Nerves: No cranial nerve deficit. Psychiatric:         Behavior: Behavior normal.         Thought Content: Thought content normal.         Judgment: Judgment normal.           LABS:     No results found for: NA, K, CL, CO2, BUN, CREATININE, GLUCOSE, CALCIUM     No results found for: WBC, HGB, HCT, MCV, PLT    Assessment:                                                                                                                                        Active Problems:    Chronic bilateral low back pain with bilateral sciatica    Sacroiliitis (HCC)    Arthritis    Trochanteric bursitis of left hip    Chronic myofascial pain    Piriformis syndrome of both sides    Pain management contract agreement  Resolved Problems:    * No resolved hospital problems. *      PLAN:  For chronic low back pain   continue hydrocodone 7.5 every 8 hours as needed with a quantity 90 patient received prescription on 2/17/2020    Continue tizanidine 4 mg gradual taper no refill needed at this time    Continue medications with no chagnes. Exercises for Si joint on left may call of an injection If patient calls to schedule appointment for injection follow up appointment will a need to be adjusted accordingly. [x] Follow up    [] 4 weeks   [] 6-8 weeks   [] 10-12 weeks   [x] 3 months  [] Post procedure to evaluate effectiveness of treatment  [] To evaluate medications changes made at office visit. [] To review diagnostics ordered at last visit. [] To evaluate response to therapy    [x] For management of controlled substance  [x] Random UDS as indicated by ORT score or if indicated by abberent behaviors    Discussion: Discussed exam findings and plan of care with patient. Patient agreed with POC and questions were asked and answered. Activity: discussed exercise as beneficial to pain reduction, encouraged stretching exercise with a focus on torso strengthening. Goals:  Pain Management Goals of Therapy:   [] Resolution in pain  [x] Decrease in pain level  [x] Improvement in ADL's  [x] Increase in activities with less pain  [] Decrease in medication      Controlled substance monitoring:    [x] Discussed medication side effects, risk of tolerance and/or dependence, and/or alternative treatment  [] Discussed the detrimental effects of long term narcotic use in younger patients especially women of childbearing years. [x] No signs and symptoms of potential drug abuse or diversion were identified  [] Signs of potential drug abuse or diversion were identified   [] ORT Score   [] UDS non-compliant   [] See Note  [x] Random urine drug screen sent today 2/22/2021  [] Random urine drug screen not completed today   [] Deferred New Patient  [] Compliant   [] Not Compliant see note  [x] Medication agreement with provider signed today 2/22/2021  [] Medication agreement with provider on file under media   [x] Medication regimen effective with c/o of side effects noted and continued   [] New patient continuing current medication while developing POC   [] On going assessment and evaluation of medication regimen  [] Medication regimen not effective see plan for changes  [x] Tha Johnson reviewed & on file under media     CC:  EDU Burch - CNP, 2/22/2021 at 9:27 AM    EMR dragon/transcription disclaimer: Much of this encounter note is electronic transcription/translation of spoken language to printed tach. Electronic translation of spoken language may be erroneous, or at times, nonsensical words or phrases may be inadvertently transcribed.  Although, I have reviewed the note for such errors, some may still exist.

## 2021-02-22 NOTE — PROGRESS NOTES
Clinic Documentation      Education Provided:  [x] Review of Mike Short  [x] Agreement Review  [x] PEG Score Calculated [x] PHQ Score Calculated [x] ORT Score Calculated    [] Compliance Issues Discussed [] Cognitive Behavior Needs [x] Exercise [] Review of Test [] Financial Issues  [x] Tobacco/Alcohol Use Reviewed [x] Teaching [] New Patient [] Picture Obtained    Physician Plan:  [] Outgoing Referral  [] Pharmacy Consult  [] Test Ordered [x] Prescription Ordered/Changed   [] Obtained Test Results / Consult Notes        Complete if patient is withholding blood thinner for procedure     Blood Thinner Patient is currently taking:      [] Plavix (Hold for 7 days)  [] Aspirin (Hold for 5 days)     [] Pletal (Hold for 2 days)  [] Pradaxa (Hold for 3 days)    [] Effient (Hold for 7 days)  [] Xarelto (Hold for 2 days)    [] Eliquis (Hold for 2 days)  [] Brilinta (Hold for 7 days)    [] Coumadin (Hold for 5 days) - (INR needs to be drawn the day prior to procedure- INR < 2.0)    [] Aggrenox (Hold for 7 days)        [] Patient will stop medication on their own.    [] Blood Thinner Form Faxed for approval to hold.    Provider form faxed to:     Assessment Completed by:  Electronically signed by Raffaele Fitzgerald RN on 2/22/2021 at 8:17 AM

## 2021-03-15 DIAGNOSIS — M54.41 CHRONIC BILATERAL LOW BACK PAIN WITH BILATERAL SCIATICA: ICD-10-CM

## 2021-03-15 DIAGNOSIS — G89.29 CHRONIC BILATERAL LOW BACK PAIN WITH BILATERAL SCIATICA: ICD-10-CM

## 2021-03-15 DIAGNOSIS — M54.42 CHRONIC BILATERAL LOW BACK PAIN WITH BILATERAL SCIATICA: ICD-10-CM

## 2021-03-16 RX ORDER — HYDROCODONE BITARTRATE AND ACETAMINOPHEN 7.5; 325 MG/1; MG/1
1 TABLET ORAL EVERY 8 HOURS PRN
Qty: 90 TABLET | Refills: 0 | Status: SHIPPED | OUTPATIENT
Start: 2021-03-19 | End: 2021-04-14 | Stop reason: SDUPTHER

## 2021-04-14 DIAGNOSIS — M54.41 CHRONIC BILATERAL LOW BACK PAIN WITH BILATERAL SCIATICA: ICD-10-CM

## 2021-04-14 DIAGNOSIS — M54.42 CHRONIC BILATERAL LOW BACK PAIN WITH BILATERAL SCIATICA: ICD-10-CM

## 2021-04-14 DIAGNOSIS — G89.29 CHRONIC BILATERAL LOW BACK PAIN WITH BILATERAL SCIATICA: ICD-10-CM

## 2021-04-14 RX ORDER — HYDROCODONE BITARTRATE AND ACETAMINOPHEN 7.5; 325 MG/1; MG/1
1 TABLET ORAL EVERY 8 HOURS PRN
Qty: 90 TABLET | Refills: 0 | Status: SHIPPED | OUTPATIENT
Start: 2021-04-17 | End: 2021-05-11 | Stop reason: SDUPTHER

## 2021-04-22 ENCOUNTER — OFFICE VISIT (OUTPATIENT)
Dept: SLEEP MEDICINE | Facility: HOSPITAL | Age: 56
End: 2021-04-22

## 2021-04-22 VITALS
OXYGEN SATURATION: 92 % | DIASTOLIC BLOOD PRESSURE: 83 MMHG | HEART RATE: 67 BPM | HEIGHT: 64 IN | SYSTOLIC BLOOD PRESSURE: 132 MMHG | BODY MASS INDEX: 34.66 KG/M2 | WEIGHT: 203 LBS

## 2021-04-22 DIAGNOSIS — G47.33 OBSTRUCTIVE SLEEP APNEA, ADULT: Primary | ICD-10-CM

## 2021-04-22 PROCEDURE — 99212 OFFICE O/P EST SF 10 MIN: CPT | Performed by: NURSE PRACTITIONER

## 2021-04-22 RX ORDER — FOLIC ACID 1 MG/1
TABLET ORAL
COMMUNITY
Start: 2021-04-13

## 2021-04-22 RX ORDER — CITALOPRAM 20 MG/1
TABLET ORAL
COMMUNITY
Start: 2021-04-13

## 2021-04-22 RX ORDER — PANTOPRAZOLE SODIUM 40 MG/10ML
INJECTION, POWDER, LYOPHILIZED, FOR SOLUTION INTRAVENOUS
COMMUNITY
Start: 2021-01-06

## 2021-04-22 NOTE — PROGRESS NOTES
Sleep Clinic Follow Up    Date: 2021  Primary Care Provider: Tess Hancock APRN    Last office visit: 2020 (I reviewed this note)    CC: Follow up: MANDY on CPAP      Interim History:  Since the last visit:    1) severe MANDY -  Emiliana Carlos has remained compliant with CPAP. She denies mask and machine issues, dry mouth, headaches, or pressures intolerance. She denies abnormal dreams, sleep paralysis, nasal congestion, URI sx.    2) Patient denies RLS symptoms.     Sleep Testin. HST at [facility] on 2019 - AHI of 44.3  2. Currently on 5-20 cm H2O    PAP Data:    Time frame: 2020-2021   Compliance: 99.2 %  Average use on days used: 7 hrs 1 min  Percent of days with usage greater than or equal to 4 hours: 94%  PAP range: 5-10 cm H2O  Average 90% pressure: 11.1 cmH2O  Leak: 31 minutes  Average AHI: 2.7 events/hr  Mask type: Full face mask  DME: [facility]    Bed time: 2200  Sleep latency: 15 minutes  Number of times awakens during the night: 1-2  Wake time: 6657-8801  Estimated total sleep time at night: 8-9 hours  Caffeine intake: 2 cups of coffee, 0 cups of tea, and 0 sodas per day  Alcohol intake: 0 drinks per week  Nap time: very rare   Sleepiness with Driving: none     [place] - 5    PMHx, FH, SH reviewed and pertinent changes are: Gastric bypass - 2021. Started celexa, Protonix, iron.       REVIEW OF SYSTEMS:   Negative for chest pain, SOA, fever, chills, cough, N/V/D, abdominal pain.    Smoking:none    Emiliana Carlos  reports that she quit smoking about 9 months ago. She smoked 1.00 pack per day. She has never used smokeless tobacco.      Exam:  Vitals:    21 0830   BP: 132/83   Pulse: 67   SpO2: 92%           21  0830   Weight: 92.1 kg (203 lb)     Body mass index is 34.83 kg/m². Patient's Body mass index is 34.83 kg/m². BMI is above normal parameters. Recommendations include: referral to primary care.      Gen:                No  distress, conversant, pleasant, appears stated age, alert, oriented  Eyes:               Anicteric sclera, moist conjunctiva, no lid lag                           PERRL, EOMI   Heent:             NC/AT                          Oropharynx clear                          Normal hearing  Lungs:             Normal effort, non-labored breathing                          Clear to auscultation bilaterally          CV:                  Normal S1/S2, no murmur                          No lower extremity edema  ABD:               Soft, rounded, non-distended                          Normal bowel sounds                    Psych:             Appropriate affect  Neuro:             CN 2-12 appear intact    Past Medical History:   Diagnosis Date   • Acute bronchitis    • Backache    • Breast cyst    • Breast lump     left breast 4 mm mass 4 oclock benign on bx    • Chest pain    • Foot pain    • Gastroesophageal reflux disease    • Generalized anxiety disorder    • History of ectopic pregnancy    • History of measles    • History of polycystic ovaries    • History of varicella    • Inflammation of foot joint    • Muscle strain     Strain of muscle of trunk   • Neck pain    • Osteoarthritis of foot joint    • Otitis media    • Pain in limb    • Peroneal tendinitis    • Plantar fasciitis    • Psoriasis    • Rheumatoid arthritis (CMS/HCC)    • Tendinitis of ankle     Tendinitis AND/OR tenosynovitis of the ankle region - EDL   • Tobacco dependence syndrome    • Upper respiratory infection        Current Outpatient Medications:   •  citalopram (CeleXA) 20 MG tablet, , Disp: , Rfl:   •  Etanercept (ENBREL) 25 MG reconstituted solution, Inject  under the skin., Disp: , Rfl:   •  folic acid (FOLVITE) 1 MG tablet, , Disp: , Rfl:   •  HYDROcodone-acetaminophen (NORCO) 7.5-325 MG per tablet, Take 1 tablet by mouth Every 6 (Six) Hours As Needed for Moderate Pain ., Disp: , Rfl:   •  methotrexate 2.5 MG tablet, Take  by mouth 3 (three) doses  each week. Take doses 12 (twelve) hours apart from each other., Disp: , Rfl:   •  pantoprazole (PROTONIX) 40 MG injection, , Disp: , Rfl:     WBC   Date Value Ref Range Status   05/19/2015 8.5 3.2 - 9.8 x1000/uL Final     RBC   Date Value Ref Range Status   05/19/2015 5.21 (H) 3.77 - 5.16 donald/mm3 Final     Hemoglobin   Date Value Ref Range Status   05/19/2015 14.1 12.0 - 15.5 gm/dl Final     Hematocrit   Date Value Ref Range Status   05/19/2015 42.3 35.0 - 45.0 % Final     MCV   Date Value Ref Range Status   05/19/2015 81.2 80.0 - 98.0 fl Final     MCH   Date Value Ref Range Status   05/19/2015 27.1 26.0 - 34.0 pg Final     MCHC   Date Value Ref Range Status   05/19/2015 33.3 31.4 - 36.0 gm/dl Final     RDW   Date Value Ref Range Status   05/19/2015 15.6 (H) 11.5 - 14.5 % Final     MPV   Date Value Ref Range Status   05/19/2015 10.5 8.0 - 12.0 fl Final     Platelets   Date Value Ref Range Status   05/19/2015 298 150 - 450 x1000/mm3 Final     Neutrophil Rel %   Date Value Ref Range Status   05/19/2015 55.7 37.0 - 80.0 % Final     Lymphocyte Rel %   Date Value Ref Range Status   05/19/2015 32.5 10.0 - 50.0 % Final     Monocyte Rel %   Date Value Ref Range Status   05/19/2015 6.8 0.0 - 12.0 % Final     Eosinophil Rel %   Date Value Ref Range Status   05/19/2015 4.0 0.0 - 7.0 % Final     Basophil Rel %   Date Value Ref Range Status   05/19/2015 0.6 0.0 - 2.0 % Final     Immature Granulocyte Rel %   Date Value Ref Range Status   05/19/2015 0.40 0.00 - 0.50 % Final     Neutrophils Absolute   Date Value Ref Range Status   05/19/2015 4.73 2.00 - 8.60 x1000/uL Final     Lymphocytes Absolute   Date Value Ref Range Status   05/19/2015 2.76 0.60 - 4.20 x1000/uL Final     Monocytes Absolute   Date Value Ref Range Status   05/19/2015 0.58 0.00 - 0.90 x1000/uL Final     Eosinophils Absolute   Date Value Ref Range Status   05/19/2015 0.34 0.00 - 0.70 x1000/uL Final     Basophils Absolute   Date Value Ref Range Status   05/19/2015  0.05 0.00 - 0.20 x1000/uL Final     Immature Granulocytes Absolute   Date Value Ref Range Status   05/19/2015 0.030 (H) 0.005 - 0.022 x1000/uL Final       Lab Results   Component Value Date    GLUCOSE 80 04/17/2014    BUN 19 04/17/2014    CREATININE 0.9 04/17/2014    K 4.1 04/17/2014    CO2 24 04/17/2014    CALCIUM 9.5 04/17/2014    ALBUMIN 3.8 04/17/2014    AST 17 04/17/2014    ALT 20 04/17/2014       Assessment and Plan:    1. Obstructive sleep apnea - Established, stable (1)  1. Compliant with PAP therapy  2. Continue PAP as prescribed  3. Script for PAP supplies  4. Return to clinic in 1 year with compliance report unless change in symptoms in interim period      I spent 15 minutes caring for Emiliana on this date of service. This time includes time spent by me in the following activities: preparing for the visit, reviewing tests, obtaining and/or reviewing a separately obtained history, performing a medically appropriate examination and/or evaluation , counseling and educating the patient/family/caregiver and documenting information in the medical record; discussing PAP therapy, PAP compliance and PAP maintenance    RTC in 12 months. Patient agrees to return sooner if changes in symptoms.        This document has been electronically signed by CRUZ Perez on April 22, 2021 08:42 CDT          CC: Tess Hancock APRN          No ref. provider found

## 2021-05-11 DIAGNOSIS — M54.41 CHRONIC BILATERAL LOW BACK PAIN WITH BILATERAL SCIATICA: ICD-10-CM

## 2021-05-11 DIAGNOSIS — M54.42 CHRONIC BILATERAL LOW BACK PAIN WITH BILATERAL SCIATICA: ICD-10-CM

## 2021-05-11 DIAGNOSIS — G89.29 CHRONIC BILATERAL LOW BACK PAIN WITH BILATERAL SCIATICA: ICD-10-CM

## 2021-05-12 RX ORDER — HYDROCODONE BITARTRATE AND ACETAMINOPHEN 7.5; 325 MG/1; MG/1
1 TABLET ORAL EVERY 8 HOURS PRN
Qty: 90 TABLET | Refills: 0 | Status: SHIPPED | OUTPATIENT
Start: 2021-05-18 | End: 2021-08-16

## 2021-05-24 ENCOUNTER — HOSPITAL ENCOUNTER (OUTPATIENT)
Dept: PAIN MANAGEMENT | Age: 56
Discharge: HOME | End: 2021-05-24
Payer: MEDICAID

## 2021-05-24 VITALS
OXYGEN SATURATION: 98 % | TEMPERATURE: 98.4 F | HEIGHT: 64 IN | BODY MASS INDEX: 32.78 KG/M2 | WEIGHT: 192 LBS | HEART RATE: 57 BPM | DIASTOLIC BLOOD PRESSURE: 89 MMHG | RESPIRATION RATE: 16 BRPM | SYSTOLIC BLOOD PRESSURE: 136 MMHG

## 2021-05-24 DIAGNOSIS — G89.29 CHRONIC BILATERAL LOW BACK PAIN WITH BILATERAL SCIATICA: ICD-10-CM

## 2021-05-24 DIAGNOSIS — G89.29 CHRONIC MYOFASCIAL PAIN: Primary | ICD-10-CM

## 2021-05-24 DIAGNOSIS — M54.42 CHRONIC BILATERAL LOW BACK PAIN WITH BILATERAL SCIATICA: ICD-10-CM

## 2021-05-24 DIAGNOSIS — M79.18 CHRONIC MYOFASCIAL PAIN: Primary | ICD-10-CM

## 2021-05-24 DIAGNOSIS — M54.41 CHRONIC BILATERAL LOW BACK PAIN WITH BILATERAL SCIATICA: ICD-10-CM

## 2021-05-24 PROCEDURE — 99213 OFFICE O/P EST LOW 20 MIN: CPT

## 2021-05-24 PROCEDURE — 99214 OFFICE O/P EST MOD 30 MIN: CPT | Performed by: NURSE PRACTITIONER

## 2021-05-24 RX ORDER — METHOCARBAMOL 500 MG/1
500 TABLET, FILM COATED ORAL 3 TIMES DAILY
Qty: 90 TABLET | Refills: 2 | Status: SHIPPED | OUTPATIENT
Start: 2021-05-24 | End: 2021-08-22

## 2021-05-24 RX ORDER — HYDROCODONE BITARTRATE AND ACETAMINOPHEN 7.5; 325 MG/1; MG/1
1 TABLET ORAL EVERY 8 HOURS PRN
Qty: 90 TABLET | Refills: 0 | Status: SHIPPED | OUTPATIENT
Start: 2021-06-16 | End: 2021-07-12 | Stop reason: SDUPTHER

## 2021-05-24 ASSESSMENT — ENCOUNTER SYMPTOMS
BACK PAIN: 1
BOWEL INCONTINENCE: 0
CONSTIPATION: 0

## 2021-05-24 ASSESSMENT — PAIN SCALES - GENERAL: PAINLEVEL_OUTOF10: 5

## 2021-05-24 ASSESSMENT — PAIN DESCRIPTION - ORIENTATION: ORIENTATION: MID;LOWER

## 2021-05-24 NOTE — PROGRESS NOTES
Clinic Documentation      Education Provided:  [x] Review of Lisa Mar  [] Agreement Review  [x] PEG Score Calculated [] PHQ Score Calculated [] ORT Score Calculated    [] Compliance Issues Discussed [] Cognitive Behavior Needs [x] Exercise [] Review of Test [] Financial Issues  [x] Tobacco/Alcohol Use Reviewed [x] Teaching [] New Patient [] Picture Obtained    Physician Plan:  [] Outgoing Referral  [] Pharmacy Consult  [] Test Ordered [x] Prescription Ordered/Changed   [] Obtained Test Results / Consult Notes        Complete if patient is withholding blood thinner for procedure     Blood Thinner Patient is currently taking:      [] Plavix (Hold for 7 days)  [] Aspirin (Hold for 5 days)     [] Pletal (Hold for 2 days)  [] Pradaxa (Hold for 3 days)    [] Effient (Hold for 7 days)  [] Xarelto (Hold for 2 days)    [] Eliquis (Hold for 2 days)  [] Brilinta (Hold for 7 days)    [] Coumadin (Hold for 5 days) - (INR needs to be drawn the day prior to procedure- INR < 2.0)    [] Aggrenox (Hold for 7 days)        [] Patient will stop medication on their own.    [] Blood Thinner Form Faxed for approval to hold.    Provider form faxed to:     Assessment Completed by:  Electronically signed by Joanne Crow RN on 5/24/2021 at 8:44 AM

## 2021-05-24 NOTE — PROGRESS NOTES
[facility] Physical [facility]    Office Visit    Patient Name: Nicolette Aldrich    MR #: [id number]    Account [de-identified]    : 1965    Age: 50 y.o. Sex: female    Date: 2021    PCP: Mariah Brooks    Chief Complaint:   Chief Complaint   Patient presents with    Back Pain       History of Present Illness: The patient is a 50 y.o. female who presents for 3 month follow up. Patient continues to loose weight from weight loss surgery. Patient states that she is having more muscle aches at night. She states that she finds in hard to get comfortable due to mid and low back cramping. Patient does not take zanaflex - muscle relaxer as this make her feel \"blah\" she states she does not have any energy to get up and go. Back Pain  This is a chronic problem. The current episode started more than 1 year ago. The problem occurs constantly. The problem has been waxing and waning since onset. The pain is present in the lumbar spine, sacro-iliac and thoracic spine. The quality of the pain is described as shooting and aching (shooting pain on left side). Radiates to: bilateral hips. The symptoms are aggravated by bending, sitting, stress and standing (walking makes left side worse). Associated symptoms include numbness (left) and tingling (left). Pertinent negatives include no bladder incontinence, bowel incontinence or weakness.        Screening Tools:    PE.3    Past PE.3    ORT: 1    PHQ-9: 2    Current Pain Assessment  Pain Assessment  Pain Assessment: 0-10  Pain Level: 5  Pain Type: Chronic pain  Pain Location: Back  Pain Orientation: Mid, Lower      Past Medical History  Past Medical History:   Diagnosis Date    Arthritis     Depression     Hypertension     Indigestion     Rheumatoid arthritis (HCC)        Allergies  Codeine    Current Medications  Current Outpatient Medications   Medication Sig Dispense Refill    HYDROcodone-acetaminophen (NORCO) 7.5-325 MG per tablet Take 1 tablet by mouth every 8 hours as needed for Pain for up to 30 days. May fill 21 90 tablet 0    pantoprazole (PROTONIX) 40 MG tablet Take 40 mg by mouth every morning (before breakfast)      tiZANidine (ZANAFLEX) 4 MG tablet 1/4 tablet with meals 1/2 to whole tablet at bedtime 60 tablet 2    escitalopram (LEXAPRO) 20 MG tablet Take 1 tablet by mouth daily  5    methotrexate (RHEUMATREX) 2.5 MG chemo tablet Take 6 tablets by mouth once a week  0    folic acid (FOLVITE) 1 MG tablet Take 1 tablet by mouth daily  0    ENBREL SURECLICK 50 MG/ML SOAJ Inject 50 mg into the skin once a week  0    Naproxen Sodium (ALEVE) 220 MG CAPS Take by mouth as needed for Pain       No current facility-administered medications for this encounter. Social History    Social History     Socioeconomic History    Marital status:      Spouse name: None    Number of children: None    Years of education: None    Highest education level: None   Occupational History    None   Tobacco Use    Smoking status: Former Smoker     Packs/day: 1.00     Types: Cigarettes     Quit date: 2019     Years since quittin.8    Smokeless tobacco: Never Used   Vaping Use    Vaping Use: Every day    Substances: Always   Substance and Sexual Activity    Alcohol use: No    Drug use: No    Sexual activity: None   Other Topics Concern    None   Social History Narrative    None     Social Determinants of Health     Financial Resource Strain:     Difficulty of Paying Living Expenses:    Food Insecurity:     Worried About Running Out of Food in the Last Year:     Ran Out of Food in the Last Year:    Transportation Needs:     Lack of Transportation (Medical):      Lack of Transportation (Non-Medical):    Physical Activity:     Days of Exercise per Week:     Minutes of Exercise per Session:    Stress:     Feeling of Stress :    Social Connections:     Frequency of Communication with Friends and Family:     Frequency of Social Gatherings with Friends and Family:     Attends Anabaptism Services:     Active Member of Clubs or Organizations:     Attends Club or Organization Meetings:     Marital Status:    Intimate Partner Violence:     Fear of Current or Ex-Partner:     Emotionally Abused:     Physically Abused:     Sexually Abused:          Family History  family history is not on file. Review of Systems:  Review of Systems   Constitutional: Negative for activity change. Gastrointestinal: Negative for bowel incontinence and constipation. Genitourinary: Negative for bladder incontinence. Musculoskeletal: Positive for arthralgias, back pain and myalgias. Negative for neck pain. Neurological: Positive for tingling (left) and numbness (left). Negative for weakness. Psychiatric/Behavioral: Negative for agitation, self-injury and suicidal ideas. The patient is not nervous/anxious. 14 point ROS negative besides that noted in HPI    Physical exam:     Patient Vitals for the past 24 hrs:   BP Temp Temp src Pulse Resp SpO2 Height Weight   05/24/21 0853 136/89 98.4 °F (36.9 °C) Temporal 57 16 98 % 5' 4\" (1.626 m) 192 lb (87.1 kg)       Body mass index is 32.96 kg/m². Physical Exam  Vitals and nursing note reviewed. Constitutional:       General: She is not in acute distress. Appearance: She is well-developed. HENT:      Head: Normocephalic. Right Ear: External ear normal.      Left Ear: External ear normal.      Nose: Nose normal.   Eyes:      Conjunctiva/sclera: Conjunctivae normal.      Pupils: Pupils are equal, round, and reactive to light. Neck:      Vascular: No JVD. Trachea: No tracheal deviation. Cardiovascular:      Rate and Rhythm: Normal rate. Pulmonary:      Effort: Pulmonary effort is normal.   Abdominal:      General: There is no distension. Tenderness: There is no abdominal tenderness. Musculoskeletal:      Thoracic back: Spasms and tenderness present. [] To review diagnostics ordered at last visit. [] To evaluate response to therapy    [x] For management of controlled substance  [x] Random UDS as indicated by ORT score or if indicated by abberent behaviors    Discussion: Discussed exam findings and plan of care with patient. Patient agreed with POC and questions were asked and answered. Activity: discussed exercise as beneficial to pain reduction, encouraged stretching exercise with a focus on torso strengthening. Goals:  Pain Management Goals of Therapy:   [] Resolution in pain  [x] Decrease in pain level  [x] Improvement in ADL's  [x] Increase in activities with less pain  [] Decrease in medication      Controlled substance monitoring:    [x] Discussed medication side effects, risk of tolerance and/or dependence, and/or alternative treatment  [] Discussed the detrimental effects of long term narcotic use in younger patients especially women of childbearing years.   [x] No signs and symptoms of potential drug abuse or diversion were identified  [] Signs of potential drug abuse or diversion were identified   [] ORT Score   [] UDS non-compliant   [] See Note  [] Random urine drug screen sent today   [x] Random urine drug screen not completed today   [] Deferred New Patient  [] Compliant  2/22/2021  [] Not Compliant see note  [] Medication agreement with provider signed today   [x] Medication agreement with provider on file under media 2/22/2021  [] Medication regimen effective with c/o of side effects noted and continued   [] New patient continuing current medication while developing POC   [] On going assessment and evaluation of medication regimen  [x] Medication regimen not effective see plan for changes  [x] Trino Marquis reviewed & on file under media     CC:  EDU Cervantes CNP, 5/24/2021 at 9:31 AM    EMR dragon/transcription disclaimer: Much of this encounter note is electronic transcription/translation of spoken language to printed tach. Electronic translation of spoken language may be erroneous, or at times, nonsensical words or phrases may be inadvertently transcribed.  Although, I have reviewed the note for such errors, some may still exist.

## 2021-07-12 DIAGNOSIS — G89.29 CHRONIC BILATERAL LOW BACK PAIN WITH BILATERAL SCIATICA: ICD-10-CM

## 2021-07-12 DIAGNOSIS — M54.41 CHRONIC BILATERAL LOW BACK PAIN WITH BILATERAL SCIATICA: ICD-10-CM

## 2021-07-12 DIAGNOSIS — M54.42 CHRONIC BILATERAL LOW BACK PAIN WITH BILATERAL SCIATICA: ICD-10-CM

## 2021-07-13 RX ORDER — HYDROCODONE BITARTRATE AND ACETAMINOPHEN 7.5; 325 MG/1; MG/1
1 TABLET ORAL EVERY 8 HOURS PRN
Qty: 90 TABLET | Refills: 0 | Status: SHIPPED | OUTPATIENT
Start: 2021-07-17 | End: 2021-08-16 | Stop reason: SDUPTHER

## 2021-08-06 ENCOUNTER — TRANSCRIBE ORDERS (OUTPATIENT)
Dept: LAB | Facility: HOSPITAL | Age: 56
End: 2021-08-06

## 2021-08-06 ENCOUNTER — LAB (OUTPATIENT)
Dept: LAB | Facility: HOSPITAL | Age: 56
End: 2021-08-06

## 2021-08-06 DIAGNOSIS — Z79.899 ENCOUNTER FOR LONG-TERM (CURRENT) USE OF OTHER MEDICATIONS: ICD-10-CM

## 2021-08-06 DIAGNOSIS — L40.59 POLYARTICULAR PSORIATIC ARTHRITIS (HCC): Primary | ICD-10-CM

## 2021-08-06 DIAGNOSIS — L40.59 POLYARTICULAR PSORIATIC ARTHRITIS (HCC): ICD-10-CM

## 2021-08-06 LAB
ALBUMIN SERPL-MCNC: 4 G/DL (ref 3.5–5.2)
ALBUMIN/GLOB SERPL: 1.3 G/DL
ALP SERPL-CCNC: 84 U/L (ref 39–117)
ALT SERPL W P-5'-P-CCNC: 10 U/L (ref 1–33)
ANION GAP SERPL CALCULATED.3IONS-SCNC: 5.7 MMOL/L (ref 5–15)
AST SERPL-CCNC: 12 U/L (ref 1–32)
BASOPHILS # BLD AUTO: 0.06 10*3/MM3 (ref 0–0.2)
BASOPHILS NFR BLD AUTO: 0.8 % (ref 0–1.5)
BILIRUB SERPL-MCNC: 0.5 MG/DL (ref 0–1.2)
BUN SERPL-MCNC: 14 MG/DL (ref 6–20)
BUN/CREAT SERPL: 17.3 (ref 7–25)
CALCIUM SPEC-SCNC: 9.4 MG/DL (ref 8.6–10.5)
CHLORIDE SERPL-SCNC: 107 MMOL/L (ref 98–107)
CO2 SERPL-SCNC: 25.3 MMOL/L (ref 22–29)
CREAT SERPL-MCNC: 0.81 MG/DL (ref 0.57–1)
DEPRECATED RDW RBC AUTO: 49.5 FL (ref 37–54)
EOSINOPHIL # BLD AUTO: 0.18 10*3/MM3 (ref 0–0.4)
EOSINOPHIL NFR BLD AUTO: 2.5 % (ref 0.3–6.2)
ERYTHROCYTE [DISTWIDTH] IN BLOOD BY AUTOMATED COUNT: 15.6 % (ref 12.3–15.4)
ERYTHROCYTE [SEDIMENTATION RATE] IN BLOOD: 7 MM/HR (ref 0–30)
GFR SERPL CREATININE-BSD FRML MDRD: 74 ML/MIN/1.73
GLOBULIN UR ELPH-MCNC: 3 GM/DL
GLUCOSE SERPL-MCNC: 90 MG/DL (ref 65–99)
HCT VFR BLD AUTO: 43.1 % (ref 34–46.6)
HGB BLD-MCNC: 14.4 G/DL (ref 12–15.9)
IMM GRANULOCYTES # BLD AUTO: 0.01 10*3/MM3 (ref 0–0.05)
IMM GRANULOCYTES NFR BLD AUTO: 0.1 % (ref 0–0.5)
LYMPHOCYTES # BLD AUTO: 2.7 10*3/MM3 (ref 0.7–3.1)
LYMPHOCYTES NFR BLD AUTO: 38.1 % (ref 19.6–45.3)
MCH RBC QN AUTO: 29.2 PG (ref 26.6–33)
MCHC RBC AUTO-ENTMCNC: 33.4 G/DL (ref 31.5–35.7)
MCV RBC AUTO: 87.4 FL (ref 79–97)
MONOCYTES # BLD AUTO: 0.41 10*3/MM3 (ref 0.1–0.9)
MONOCYTES NFR BLD AUTO: 5.8 % (ref 5–12)
NEUTROPHILS NFR BLD AUTO: 3.72 10*3/MM3 (ref 1.7–7)
NEUTROPHILS NFR BLD AUTO: 52.7 % (ref 42.7–76)
NRBC BLD AUTO-RTO: 0 /100 WBC (ref 0–0.2)
PLATELET # BLD AUTO: 271 10*3/MM3 (ref 140–450)
PMV BLD AUTO: 12.6 FL (ref 6–12)
POTASSIUM SERPL-SCNC: 4.1 MMOL/L (ref 3.5–5.2)
PROT SERPL-MCNC: 7 G/DL (ref 6–8.5)
RBC # BLD AUTO: 4.93 10*6/MM3 (ref 3.77–5.28)
SODIUM SERPL-SCNC: 138 MMOL/L (ref 136–145)
WBC # BLD AUTO: 7.08 10*3/MM3 (ref 3.4–10.8)

## 2021-08-06 PROCEDURE — 86480 TB TEST CELL IMMUN MEASURE: CPT

## 2021-08-06 PROCEDURE — 85025 COMPLETE CBC W/AUTO DIFF WBC: CPT

## 2021-08-06 PROCEDURE — 80053 COMPREHEN METABOLIC PANEL: CPT

## 2021-08-06 PROCEDURE — 85652 RBC SED RATE AUTOMATED: CPT

## 2021-08-06 PROCEDURE — 36415 COLL VENOUS BLD VENIPUNCTURE: CPT

## 2021-08-11 LAB
GAMMA INTERFERON BACKGROUND BLD IA-ACNC: 0.03 IU/ML
M TB IFN-G BLD-IMP: NEGATIVE
M TB IFN-G CD4+ BCKGRND COR BLD-ACNC: 0.03 IU/ML
M TB IFN-G CD4+CD8+ BCKGRND COR BLD-ACNC: 0.03 IU/ML
MITOGEN IGNF BLD-ACNC: >10 IU/ML
QUANTIFERON INCUBATION: NORMAL
SERVICE CMNT-IMP: NORMAL

## 2021-08-16 DIAGNOSIS — G89.29 CHRONIC BILATERAL LOW BACK PAIN WITH BILATERAL SCIATICA: ICD-10-CM

## 2021-08-16 DIAGNOSIS — M54.42 CHRONIC BILATERAL LOW BACK PAIN WITH BILATERAL SCIATICA: ICD-10-CM

## 2021-08-16 DIAGNOSIS — M54.41 CHRONIC BILATERAL LOW BACK PAIN WITH BILATERAL SCIATICA: ICD-10-CM

## 2021-08-18 RX ORDER — HYDROCODONE BITARTRATE AND ACETAMINOPHEN 7.5; 325 MG/1; MG/1
1 TABLET ORAL EVERY 8 HOURS PRN
Qty: 90 TABLET | Refills: 0 | Status: SHIPPED | OUTPATIENT
Start: 2021-08-18 | End: 2021-08-24 | Stop reason: SDUPTHER

## 2021-08-24 ENCOUNTER — HOSPITAL ENCOUNTER (OUTPATIENT)
Dept: PAIN MANAGEMENT | Age: 56
Discharge: HOME | End: 2021-08-24
Payer: MEDICAID

## 2021-08-24 VITALS
HEART RATE: 69 BPM | BODY MASS INDEX: 29.19 KG/M2 | SYSTOLIC BLOOD PRESSURE: 122 MMHG | DIASTOLIC BLOOD PRESSURE: 83 MMHG | TEMPERATURE: 97.2 F | OXYGEN SATURATION: 99 % | HEIGHT: 64 IN | WEIGHT: 171 LBS | RESPIRATION RATE: 16 BRPM

## 2021-08-24 DIAGNOSIS — G89.29 CHRONIC BILATERAL LOW BACK PAIN WITH BILATERAL SCIATICA: ICD-10-CM

## 2021-08-24 DIAGNOSIS — M54.42 CHRONIC BILATERAL LOW BACK PAIN WITH BILATERAL SCIATICA: ICD-10-CM

## 2021-08-24 DIAGNOSIS — M54.41 CHRONIC BILATERAL LOW BACK PAIN WITH BILATERAL SCIATICA: ICD-10-CM

## 2021-08-24 PROCEDURE — 99213 OFFICE O/P EST LOW 20 MIN: CPT

## 2021-08-24 PROCEDURE — 99213 OFFICE O/P EST LOW 20 MIN: CPT | Performed by: NURSE PRACTITIONER

## 2021-08-24 RX ORDER — HYDROCODONE BITARTRATE AND ACETAMINOPHEN 7.5; 325 MG/1; MG/1
1 TABLET ORAL EVERY 8 HOURS PRN
Qty: 90 TABLET | Refills: 0 | Status: SHIPPED | OUTPATIENT
Start: 2021-09-17 | End: 2021-10-12 | Stop reason: SDUPTHER

## 2021-08-24 ASSESSMENT — ENCOUNTER SYMPTOMS
BACK PAIN: 1
CONSTIPATION: 0
BOWEL INCONTINENCE: 0

## 2021-08-24 ASSESSMENT — PAIN DESCRIPTION - PAIN TYPE: TYPE: CHRONIC PAIN

## 2021-08-24 ASSESSMENT — PAIN DESCRIPTION - LOCATION: LOCATION: BACK

## 2021-08-24 ASSESSMENT — PAIN DESCRIPTION - ORIENTATION: ORIENTATION: LOWER;MID

## 2021-08-24 ASSESSMENT — PAIN SCALES - GENERAL: PAINLEVEL_OUTOF10: 5

## 2021-08-24 NOTE — PROGRESS NOTES
[facility] Physical [facility] Pain [facility]    Office Visit    Patient Name: Mira Cogan    MR #: [id number]    Account [de-identified]    : 1965    Age: 50 y.o. Sex: female    Date: 2021    PCP: Alison Marroquin    Chief Complaint:   Chief Complaint   Patient presents with    Back Pain       History of Present Illness: The patient is a 50 y.o. female who presents for 3 month follow up. Back Pain  This is a chronic problem. The current episode started more than 1 year ago. The problem occurs constantly. The problem has been waxing and waning since onset. The pain is present in the thoracic spine, sacro-iliac and lumbar spine. The quality of the pain is described as shooting and aching (shooting pain on left side). Radiates to: bilateral hips. The symptoms are aggravated by bending, sitting, standing and stress (walking makes left side worse). Associated symptoms include numbness (left) and tingling (left). Pertinent negatives include no bladder incontinence, bowel incontinence or weakness. Screening Tools:    PE    Past PE.3    ORT: 1    PHQ-9: 2    Current Pain Assessment  Pain Assessment  Pain Assessment: 0-10  Pain Level: 5  Pain Type: Chronic pain  Pain Location: Back  Pain Orientation: Lower, Mid  POSS Score (Patient Ctrl Analgesia): 1      Past Medical History  Past Medical History:   Diagnosis Date    Arthritis     Depression     Hypertension     Indigestion     Rheumatoid arthritis (HCC)        Allergies  Codeine    Current Medications  Current Outpatient Medications   Medication Sig Dispense Refill    HYDROcodone-acetaminophen (NORCO) 7.5-325 MG per tablet Take 1 tablet by mouth every 8 hours as needed for Pain for up to 30 days.  90 tablet 0    pantoprazole (PROTONIX) 40 MG tablet Take 40 mg by mouth every morning (before breakfast)      escitalopram (LEXAPRO) 20 MG tablet Take 1 tablet by mouth daily  5    methotrexate (RHEUMATREX) 2.5 MG chemo tablet Take Constitutional: Negative for activity change. Gastrointestinal: Negative for bowel incontinence and constipation. Genitourinary: Negative for bladder incontinence. Musculoskeletal: Positive for arthralgias, back pain and myalgias. Negative for neck pain. Neurological: Positive for tingling (left) and numbness (left). Negative for weakness. Psychiatric/Behavioral: Negative for agitation, self-injury and suicidal ideas. The patient is not nervous/anxious. 14 point ROS negative besides that noted in HPI    Physical exam:     Patient Vitals for the past 24 hrs:   BP Temp Temp src Pulse Resp SpO2 Height Weight   08/24/21 0949 122/83 97.2 °F (36.2 °C) Temporal 69 16 99 % 5' 4\" (1.626 m) 171 lb (77.6 kg)       Body mass index is 29.35 kg/m². Physical Exam  Vitals and nursing note reviewed. Constitutional:       General: She is not in acute distress. Appearance: She is well-developed. HENT:      Head: Normocephalic. Right Ear: External ear normal.      Left Ear: External ear normal.      Nose: Nose normal.   Eyes:      Conjunctiva/sclera: Conjunctivae normal.      Pupils: Pupils are equal, round, and reactive to light. Neck:      Vascular: No JVD. Trachea: No tracheal deviation. Cardiovascular:      Rate and Rhythm: Normal rate. Pulmonary:      Effort: Pulmonary effort is normal.   Abdominal:      General: There is no distension. Tenderness: There is no abdominal tenderness. Musculoskeletal:      Thoracic back: Spasms and tenderness present. Lumbar back: Spasms and tenderness (Bilateral SI joint TTP) present. Decreased range of motion. Negative right straight leg raise test and negative left straight leg raise test.   Skin:     General: Skin is warm and dry. Neurological:      Mental Status: She is alert and oriented to person, place, and time. Cranial Nerves: No cranial nerve deficit.    Psychiatric:         Behavior: Behavior normal.         Thought Content: Thought content normal.         Judgment: Judgment normal.           LABS:     No results found for: NA, K, CL, CO2, BUN, CREATININE, GLUCOSE, CALCIUM     No results found for: WBC, HGB, HCT, MCV, PLT    Assessment:                                                                                                                                        Active Problems:    Chronic bilateral low back pain with bilateral sciatica    Sacroiliitis (HCC)    Arthritis    Trochanteric bursitis of left hip    Chronic myofascial pain    Piriformis syndrome of both sides    Pain management contract agreement  Resolved Problems:    * No resolved hospital problems. *      PLAN:  Chronic myofascial pain  Continue no refill - methocarbamol (ROBAXIN) 500 MG tablet; Take 1 tablet by mouth 3 times daily  Dispense: 90 tablet; Refill: 2    Chronic bilateral low back pain with bilateral sciatica  Refill sent with fill date 9/17/2021 - HYDROcodone-acetaminophen (NORCO) 7.5-325 MG per tablet; Take 1 tablet by mouth every 8 hours as needed for Pain for up to 30 days. Intended supply: 30 days  Dispense: 90 tablet; Refill: 0    Patient has flares with RA. Patient is asking if there is anything she can take. Due to gastric surgery, her options are limited. Patient instructed to talk to bariatric surgeon regarding possible Celebrex. Patient may call for SI joint injection if needed in the future. [x] Follow up    [] 4 weeks   [] 6-8 weeks   [] 10-12 weeks   [x] 3 months  [] Post procedure to evaluate effectiveness of treatment  [] To evaluate medications changes made at office visit. [] To review diagnostics ordered at last visit. [] To evaluate response to therapy    [x] For management of controlled substance  [x] Random UDS as indicated by ORT score or if indicated by abberent behaviors    Discussion: Discussed exam findings and plan of care with patient. Patient agreed with POC and questions were asked and answered.      Activity: discussed exercise as beneficial to pain reduction, encouraged stretching exercise with a focus on torso strengthening. Goals:  Pain Management Goals of Therapy:   [] Resolution in pain  [x] Decrease in pain level  [x] Improvement in ADL's  [x] Increase in activities with less pain  [] Decrease in medication      Controlled substance monitoring:    [x] Discussed medication side effects, risk of tolerance and/or dependence, and/or alternative treatment  [] Discussed the detrimental effects of long term narcotic use in younger patients especially women of childbearing years. [x] No signs and symptoms of potential drug abuse or diversion were identified  [] Signs of potential drug abuse or diversion were identified   [] ORT Score   [] UDS non-compliant   [] See Note  [] Random urine drug screen sent today   [x] Random urine drug screen not completed today   [] Deferred New Patient  [] Compliant  2/22/2021  [] Not Compliant see note  [] Medication agreement with provider signed today   [x] Medication agreement with provider on file under media 2/22/2021  [] Medication regimen effective with c/o of side effects noted and continued   [] New patient continuing current medication while developing POC   [] On going assessment and evaluation of medication regimen  [x] Medication regimen not effective see plan for changes  [x] Glenna Rear reviewed & on file under media     CC:  EDU Peoples CNP, 8/24/2021 at 10:30 AM    EMR dragon/transcription disclaimer: Much of this encounter note is electronic transcription/translation of spoken language to printed tach. Electronic translation of spoken language may be erroneous, or at times, nonsensical words or phrases may be inadvertently transcribed.  Although, I have reviewed the note for such errors, some may still exist.

## 2021-08-24 NOTE — PROGRESS NOTES
Clinic Documentation      Education Provided:  [x] Review of [facility] [employer]  [] Agreement Review  [x] PEG Score Calculated [] PHQ Score Calculated [] ORT Score Calculated    [] Compliance Issues Discussed [] Cognitive Behavior Needs [x] Exercise [] Review of Test [] Financial Issues  [x] Tobacco/Alcohol Use Reviewed [x] Teaching [] New Patient [] Picture Obtained    Physician Plan:  [] Outgoing Referral  [] Pharmacy Consult  [] Test Ordered [x] Prescription Ordered/Changed   [] Obtained Test Results / Consult Notes        Complete if patient is withholding blood thinner for procedure     Blood Thinner Patient is currently taking:      [] Plavix (Hold for 7 days)  [] Aspirin (Hold for 5 days)     [] Pletal (Hold for 2 days)  [] Pradaxa (Hold for 3 days)    [] Effient (Hold for 7 days)  [] Xarelto (Hold for 2 days)    [] Eliquis (Hold for 2 days)  [] Brilinta (Hold for 7 days)    [] Coumadin (Hold for 5 days) - (INR needs to be drawn the day prior to procedure- INR < 2.0)    [] Aggrenox (Hold for 7 days)        [] Patient will stop medication on their own.    [] Blood Thinner Form Faxed for approval to hold.    Provider form faxed to:     Assessment Completed by:  Electronically signed by Tyrell Lazo RN on 8/24/2021 at 9:42 AM

## 2021-10-12 DIAGNOSIS — G89.29 CHRONIC BILATERAL LOW BACK PAIN WITH BILATERAL SCIATICA: ICD-10-CM

## 2021-10-12 DIAGNOSIS — M54.42 CHRONIC BILATERAL LOW BACK PAIN WITH BILATERAL SCIATICA: ICD-10-CM

## 2021-10-12 DIAGNOSIS — M54.41 CHRONIC BILATERAL LOW BACK PAIN WITH BILATERAL SCIATICA: ICD-10-CM

## 2021-10-15 RX ORDER — HYDROCODONE BITARTRATE AND ACETAMINOPHEN 7.5; 325 MG/1; MG/1
1 TABLET ORAL EVERY 8 HOURS PRN
Qty: 90 TABLET | Refills: 0 | Status: SHIPPED | OUTPATIENT
Start: 2021-10-16 | End: 2021-11-15 | Stop reason: SDUPTHER

## 2021-10-18 ENCOUNTER — TELEPHONE (OUTPATIENT)
Dept: PAIN MANAGEMENT | Age: 56
End: 2021-10-18

## 2021-11-15 DIAGNOSIS — M54.41 CHRONIC BILATERAL LOW BACK PAIN WITH BILATERAL SCIATICA: ICD-10-CM

## 2021-11-15 DIAGNOSIS — G89.29 CHRONIC BILATERAL LOW BACK PAIN WITH BILATERAL SCIATICA: ICD-10-CM

## 2021-11-15 DIAGNOSIS — M54.42 CHRONIC BILATERAL LOW BACK PAIN WITH BILATERAL SCIATICA: ICD-10-CM

## 2021-11-16 RX ORDER — HYDROCODONE BITARTRATE AND ACETAMINOPHEN 7.5; 325 MG/1; MG/1
1 TABLET ORAL EVERY 8 HOURS PRN
Qty: 90 TABLET | Refills: 0 | Status: SHIPPED | OUTPATIENT
Start: 2021-11-16 | End: 2021-11-30 | Stop reason: SDUPTHER

## 2021-11-30 ENCOUNTER — HOSPITAL ENCOUNTER (OUTPATIENT)
Dept: PAIN MANAGEMENT | Age: 56
Discharge: HOME | End: 2021-11-30
Payer: MEDICAID

## 2021-11-30 VITALS
RESPIRATION RATE: 16 BRPM | HEIGHT: 64 IN | OXYGEN SATURATION: 98 % | DIASTOLIC BLOOD PRESSURE: 85 MMHG | BODY MASS INDEX: 27.72 KG/M2 | WEIGHT: 162.4 LBS | TEMPERATURE: 98.1 F | SYSTOLIC BLOOD PRESSURE: 126 MMHG | HEART RATE: 66 BPM

## 2021-11-30 DIAGNOSIS — M79.18 CHRONIC MYOFASCIAL PAIN: Primary | ICD-10-CM

## 2021-11-30 DIAGNOSIS — G89.29 CHRONIC BILATERAL LOW BACK PAIN WITH BILATERAL SCIATICA: ICD-10-CM

## 2021-11-30 DIAGNOSIS — M54.42 CHRONIC BILATERAL LOW BACK PAIN WITH BILATERAL SCIATICA: ICD-10-CM

## 2021-11-30 DIAGNOSIS — M54.41 CHRONIC BILATERAL LOW BACK PAIN WITH BILATERAL SCIATICA: ICD-10-CM

## 2021-11-30 DIAGNOSIS — G89.29 CHRONIC MYOFASCIAL PAIN: Primary | ICD-10-CM

## 2021-11-30 PROCEDURE — 99213 OFFICE O/P EST LOW 20 MIN: CPT | Performed by: NURSE PRACTITIONER

## 2021-11-30 PROCEDURE — 99213 OFFICE O/P EST LOW 20 MIN: CPT

## 2021-11-30 RX ORDER — HYDROCODONE BITARTRATE AND ACETAMINOPHEN 7.5; 325 MG/1; MG/1
1 TABLET ORAL EVERY 8 HOURS PRN
Qty: 90 TABLET | Refills: 0 | Status: SHIPPED | OUTPATIENT
Start: 2022-01-15 | End: 2022-02-14 | Stop reason: SDUPTHER

## 2021-11-30 RX ORDER — HYDROCODONE BITARTRATE AND ACETAMINOPHEN 7.5; 325 MG/1; MG/1
1 TABLET ORAL EVERY 8 HOURS PRN
Qty: 90 TABLET | Refills: 0 | Status: SHIPPED | OUTPATIENT
Start: 2021-12-16 | End: 2022-04-11

## 2021-11-30 RX ORDER — METHOCARBAMOL 500 MG/1
500 TABLET, FILM COATED ORAL 3 TIMES DAILY
Qty: 90 TABLET | Refills: 2 | Status: SHIPPED | OUTPATIENT
Start: 2021-11-30 | End: 2022-02-28

## 2021-11-30 ASSESSMENT — ENCOUNTER SYMPTOMS
BACK PAIN: 1
CONSTIPATION: 0
BOWEL INCONTINENCE: 0

## 2021-11-30 ASSESSMENT — PAIN DESCRIPTION - LOCATION: LOCATION: BACK

## 2021-11-30 ASSESSMENT — PAIN DESCRIPTION - ORIENTATION: ORIENTATION: LOWER;MID

## 2021-11-30 ASSESSMENT — PAIN SCALES - GENERAL: PAINLEVEL_OUTOF10: 4

## 2021-11-30 ASSESSMENT — PAIN DESCRIPTION - PAIN TYPE: TYPE: CHRONIC PAIN

## 2021-11-30 NOTE — PROGRESS NOTES
[facility] Physical & Pain Medicine    Office Visit    Patient Name: Fady Cheema    MR #: [id number]    Account [de-identified]    : 1965    Age: 50 y.o. Sex: female    Date: 2021    PCP: Lennox Grant    Chief Complaint:   Chief Complaint   Patient presents with    Back Pain       History of Present Illness: The patient is a 50 y.o. female who presents for 3 month follow up. Back Pain  This is a chronic problem. The current episode started more than 1 year ago. The problem occurs constantly. The problem has been waxing and waning since onset. The pain is present in the lumbar spine, sacro-iliac and thoracic spine. The quality of the pain is described as shooting and aching (shooting pain on left side). Radiates to: bilateral hips. The symptoms are aggravated by bending, sitting, standing and stress (walking makes left side worse). Associated symptoms include numbness (left) and tingling (left). Pertinent negatives include no bladder incontinence, bowel incontinence or weakness. Screening Tools:    PE    Past PE    ORT: 1    PHQ-9: 2    Current Pain Assessment  Pain Assessment  Pain Assessment: 0-10  Pain Level: 4  Pain Type: Chronic pain  Pain Location: Back  Pain Orientation: Lower, Mid  POSS Score (Patient Ctrl Analgesia): 1      Past Medical History  Past Medical History:   Diagnosis Date    Arthritis     Depression     Hypertension     Indigestion     Rheumatoid arthritis (HCC)        Allergies  Codeine    Current Medications  Current Outpatient Medications   Medication Sig Dispense Refill    HYDROcodone-acetaminophen (NORCO) 7.5-325 MG per tablet Take 1 tablet by mouth every 8 hours as needed for Pain for up to 30 days.  90 tablet 0    pantoprazole (PROTONIX) 40 MG tablet Take 40 mg by mouth every morning (before breakfast)      escitalopram (LEXAPRO) 20 MG tablet Take 1 tablet by mouth daily  5    methotrexate (RHEUMATREX) 2.5 MG chemo tablet Take 6 tablets by mouth once a week  0    folic acid (FOLVITE) 1 MG tablet Take 1 tablet by mouth daily  0    ENBREL SURECLICK 50 MG/ML SOAJ Inject 50 mg into the skin once a week  0    Naproxen Sodium (ALEVE) 220 MG CAPS Take by mouth as needed for Pain       No current facility-administered medications for this encounter. Social History    Social History     Socioeconomic History    Marital status:      Spouse name: None    Number of children: None    Years of education: None    Highest education level: None   Occupational History    None   Tobacco Use    Smoking status: Former Smoker     Packs/day: 1.00     Types: Cigarettes     Quit date: 2019     Years since quittin.3    Smokeless tobacco: Never Used   Vaping Use    Vaping Use: Every day    Substances: Always   Substance and Sexual Activity    Alcohol use: No    Drug use: No    Sexual activity: None   Other Topics Concern    None   Social History Narrative    None     Social Determinants of Health     Financial Resource Strain:     Difficulty of Paying Living Expenses: Not on file   Food Insecurity:     Worried About Running Out of Food in the Last Year: Not on file    Ramos of Food in the Last Year: Not on file   Transportation Needs:     Lack of Transportation (Medical): Not on file    Lack of Transportation (Non-Medical):  Not on file   Physical Activity:     Days of Exercise per Week: Not on file    Minutes of Exercise per Session: Not on file   Stress:     Feeling of Stress : Not on file   Social Connections:     Frequency of Communication with Friends and Family: Not on file    Frequency of Social Gatherings with Friends and Family: Not on file    Attends Protestant Services: Not on file    Active Member of Clubs or Organizations: Not on file    Attends Club or Organization Meetings: Not on file    Marital Status: Not on file   Intimate Partner Violence:     Fear of Current or Ex-Partner: Not on file   Warden Dang Emotionally Abused: Not on file    Physically Abused: Not on file    Sexually Abused: Not on file   Housing Stability:     Unable to Pay for Housing in the Last Year: Not on file    Number of Places Lived in the Last Year: Not on file    Unstable Housing in the Last Year: Not on file         Family History  family history is not on file. Review of Systems:  Review of Systems   Constitutional: Negative for activity change. Gastrointestinal: Negative for bowel incontinence and constipation. Genitourinary: Negative for bladder incontinence. Musculoskeletal: Positive for arthralgias, back pain and myalgias. Negative for neck pain. Neurological: Positive for tingling (left) and numbness (left). Negative for weakness. Psychiatric/Behavioral: Negative for agitation, self-injury and suicidal ideas. The patient is not nervous/anxious. 14 point ROS negative besides that noted in HPI    Physical exam:     Patient Vitals for the past 24 hrs:   BP Temp Temp src Pulse Resp SpO2 Height Weight   11/30/21 1051 126/85 98.1 °F (36.7 °C) Temporal 66 16 98 % 5' 4\" (1.626 m) 162 lb 6.4 oz (73.7 kg)       Body mass index is 27.88 kg/m². Physical Exam  Vitals and nursing note reviewed. Constitutional:       General: She is not in acute distress. Appearance: She is well-developed. HENT:      Head: Normocephalic. Right Ear: External ear normal.      Left Ear: External ear normal.      Nose: Nose normal.   Eyes:      Conjunctiva/sclera: Conjunctivae normal.      Pupils: Pupils are equal, round, and reactive to light. Neck:      Vascular: No JVD. Trachea: No tracheal deviation. Cardiovascular:      Rate and Rhythm: Normal rate. Pulmonary:      Effort: Pulmonary effort is normal.   Abdominal:      General: There is no distension. Tenderness: There is no abdominal tenderness. Musculoskeletal:      Thoracic back: Spasms and tenderness present.       Lumbar back: Spasms and tenderness (Bilateral SI joint TTP) present. Decreased range of motion. Negative right straight leg raise test and negative left straight leg raise test.   Skin:     General: Skin is warm and dry. Neurological:      Mental Status: She is alert and oriented to person, place, and time. Cranial Nerves: No cranial nerve deficit. Psychiatric:         Behavior: Behavior normal.         Thought Content: Thought content normal.         Judgment: Judgment normal.           LABS:     No results found for: NA, K, CL, CO2, BUN, CREATININE, GLUCOSE, CALCIUM     No results found for: WBC, HGB, HCT, MCV, PLT    Assessment:                                                                                                                                        Active Problems:    Chronic bilateral low back pain with bilateral sciatica    Sacroiliitis (HCC)    Arthritis    Trochanteric bursitis of left hip    Chronic myofascial pain    Piriformis syndrome of both sides    Pain management contract agreement  Resolved Problems:    * No resolved hospital problems. *      PLAN:  Chronic myofascial pain  Refill sent at appointment - methocarbamol (ROBAXIN) 500 MG tablet; Take 1 tablet by mouth 3 times daily  Dispense: 90 tablet; Refill: 2    Chronic bilateral low back pain with bilateral sciatica    Due to upcoming holiday with office being closed in honor of upcoming holidays, patient's next two month prescriptions will be e-scribed with appropriate fill dates according to NATHAN report with making adjustments in fill dates due to weekends as appropriate. Prescriptions sent to pharmacy only call for February refill   Fill 12/16/2021  Fill 1/15/2022     HYDROcodone-acetaminophen (NORCO) 7.5-325 MG per tablet; Take 1 tablet by mouth every 8 hours as needed for Pain for up to 30 days. Intended supply: 30 days  Dispense: 90 tablet;  Refill: 0      [x] Follow up    [] 4 weeks   [] 6-8 weeks   [] 10-12 weeks   [x] 3 months  [] Post procedure to evaluate effectiveness of treatment  [] To evaluate medications changes made at office visit. [] To review diagnostics ordered at last visit. [] To evaluate response to therapy    [x] For management of controlled substance  [x] Random UDS as indicated by ORT score or if indicated by abberent behaviors    Discussion: Discussed exam findings and plan of care with patient. Patient agreed with POC and questions were asked and answered. Activity: discussed exercise as beneficial to pain reduction, encouraged stretching exercise with a focus on torso strengthening. Goals:  Pain Management Goals of Therapy:   [] Resolution in pain  [x] Decrease in pain level  [x] Improvement in ADL's  [x] Increase in activities with less pain  [] Decrease in medication      Controlled substance monitoring:    [x] Discussed medication side effects, risk of tolerance and/or dependence, and/or alternative treatment  [] Discussed the detrimental effects of long term narcotic use in younger patients especially women of childbearing years.   [x] No signs and symptoms of potential drug abuse or diversion were identified  [] Signs of potential drug abuse or diversion were identified   [] ORT Score   [] UDS non-compliant   [] See Note  [] Random urine drug screen sent today   [x] Random urine drug screen not completed today   [] Deferred New Patient  [] Compliant  2/22/2021  [] Not Compliant see note  [] Medication agreement with provider signed today   [x] Medication agreement with provider on file under media 2/22/2021  [] Medication regimen effective with c/o of side effects noted and continued   [] New patient continuing current medication while developing POC   [] On going assessment and evaluation of medication regimen  [x] Medication regimen not effective see plan for changes  [x] Karina Arias reviewed & on file under media     CC:  EDU Ang CNP, 11/30/2021 at 11:26 AM    EMR dragon/transcription disclaimer: Much of this encounter note is electronic transcription/translation of spoken language to printed tach. Electronic translation of spoken language may be erroneous, or at times, nonsensical words or phrases may be inadvertently transcribed.  Although, I have reviewed the note for such errors, some may still exist.

## 2021-11-30 NOTE — TELEPHONE ENCOUNTER
Due to upcoming holiday with office being closed in honor of upcoming holidays, patient's next two month prescriptions will be e-scribed with appropriate fill dates according to NATHAN report with making adjustments in fill dates due to weekends as appropriate.       Fill 12/16/2021  Fill 1/15/2022

## 2021-11-30 NOTE — PROGRESS NOTES
Clinic Documentation      Education Provided:  [x] Review of Daina Friday  [] Agreement Review  [x] PEG Score Calculated [] PHQ Score Calculated [] ORT Score Calculated    [] Compliance Issues Discussed [] Cognitive Behavior Needs [x] Exercise [] Review of Test [] Financial Issues  [x] Tobacco/Alcohol Use Reviewed [x] Teaching [] New Patient [] Picture Obtained    Physician Plan:  [] Outgoing Referral  [] Pharmacy Consult  [] Test Ordered [x] Prescription Ordered/Changed   [] Obtained Test Results / Consult Notes        Complete if patient is withholding blood thinner for procedure     Blood Thinner Patient is currently taking:      [] Plavix (Hold for 7 days)  [] Aspirin (Hold for 5 days)     [] Pletal (Hold for 2 days)  [] Pradaxa (Hold for 3 days)    [] Effient (Hold for 7 days)  [] Xarelto (Hold for 2 days)    [] Eliquis (Hold for 2 days)  [] Brilinta (Hold for 7 days)    [] Coumadin (Hold for 5 days) - (INR needs to be drawn the day prior to procedure- INR < 2.0)    [] Aggrenox (Hold for 7 days)        [] Patient will stop medication on their own.    [] Blood Thinner Form Faxed for approval to hold.    Provider form faxed to:     Assessment Completed by:  Electronically signed by Yamini Benavidez RN on 11/30/2021 at 10:29 AM

## 2022-01-10 ENCOUNTER — TELEPHONE (OUTPATIENT)
Dept: PAIN MANAGEMENT | Age: 57
End: 2022-01-10

## 2022-01-10 DIAGNOSIS — G89.29 CHRONIC BILATERAL LOW BACK PAIN WITH BILATERAL SCIATICA: ICD-10-CM

## 2022-01-10 DIAGNOSIS — M54.41 CHRONIC BILATERAL LOW BACK PAIN WITH BILATERAL SCIATICA: ICD-10-CM

## 2022-01-10 DIAGNOSIS — M54.42 CHRONIC BILATERAL LOW BACK PAIN WITH BILATERAL SCIATICA: ICD-10-CM

## 2022-02-14 DIAGNOSIS — M54.41 CHRONIC BILATERAL LOW BACK PAIN WITH BILATERAL SCIATICA: ICD-10-CM

## 2022-02-14 DIAGNOSIS — G89.29 CHRONIC BILATERAL LOW BACK PAIN WITH BILATERAL SCIATICA: ICD-10-CM

## 2022-02-14 DIAGNOSIS — M54.42 CHRONIC BILATERAL LOW BACK PAIN WITH BILATERAL SCIATICA: ICD-10-CM

## 2022-02-14 RX ORDER — HYDROCODONE BITARTRATE AND ACETAMINOPHEN 7.5; 325 MG/1; MG/1
1 TABLET ORAL EVERY 8 HOURS PRN
Qty: 90 TABLET | Refills: 0 | Status: SHIPPED | OUTPATIENT
Start: 2022-02-14 | End: 2022-03-09 | Stop reason: SDUPTHER

## 2022-03-09 DIAGNOSIS — G89.29 CHRONIC BILATERAL LOW BACK PAIN WITH BILATERAL SCIATICA: ICD-10-CM

## 2022-03-09 DIAGNOSIS — M54.42 CHRONIC BILATERAL LOW BACK PAIN WITH BILATERAL SCIATICA: ICD-10-CM

## 2022-03-09 DIAGNOSIS — M54.41 CHRONIC BILATERAL LOW BACK PAIN WITH BILATERAL SCIATICA: ICD-10-CM

## 2022-03-10 RX ORDER — HYDROCODONE BITARTRATE AND ACETAMINOPHEN 7.5; 325 MG/1; MG/1
1 TABLET ORAL EVERY 8 HOURS PRN
Qty: 90 TABLET | Refills: 0 | Status: SHIPPED | OUTPATIENT
Start: 2022-03-17 | End: 2022-04-11 | Stop reason: SDUPTHER

## 2022-04-11 DIAGNOSIS — M54.41 CHRONIC BILATERAL LOW BACK PAIN WITH BILATERAL SCIATICA: ICD-10-CM

## 2022-04-11 DIAGNOSIS — G89.29 CHRONIC BILATERAL LOW BACK PAIN WITH BILATERAL SCIATICA: ICD-10-CM

## 2022-04-11 DIAGNOSIS — M54.42 CHRONIC BILATERAL LOW BACK PAIN WITH BILATERAL SCIATICA: ICD-10-CM

## 2022-04-11 RX ORDER — HYDROCODONE BITARTRATE AND ACETAMINOPHEN 7.5; 325 MG/1; MG/1
1 TABLET ORAL EVERY 8 HOURS PRN
Qty: 90 TABLET | Refills: 0 | Status: SHIPPED | OUTPATIENT
Start: 2022-04-16 | End: 2022-05-11 | Stop reason: SDUPTHER

## 2022-04-14 ENCOUNTER — TELEPHONE (OUTPATIENT)
Dept: PAIN MANAGEMENT | Age: 57
End: 2022-04-14

## 2022-05-11 DIAGNOSIS — M54.42 CHRONIC BILATERAL LOW BACK PAIN WITH BILATERAL SCIATICA: ICD-10-CM

## 2022-05-11 DIAGNOSIS — M54.41 CHRONIC BILATERAL LOW BACK PAIN WITH BILATERAL SCIATICA: ICD-10-CM

## 2022-05-11 DIAGNOSIS — G89.29 CHRONIC BILATERAL LOW BACK PAIN WITH BILATERAL SCIATICA: ICD-10-CM

## 2022-05-11 RX ORDER — HYDROCODONE BITARTRATE AND ACETAMINOPHEN 7.5; 325 MG/1; MG/1
1 TABLET ORAL EVERY 8 HOURS PRN
Qty: 9 TABLET | Refills: 0 | Status: SHIPPED | OUTPATIENT
Start: 2022-05-16 | End: 2022-05-19 | Stop reason: SDUPTHER

## 2022-05-19 ENCOUNTER — HOSPITAL ENCOUNTER (OUTPATIENT)
Dept: PAIN MANAGEMENT | Age: 57
Discharge: HOME | End: 2022-05-19
Payer: MEDICARE

## 2022-05-19 VITALS
HEIGHT: 64 IN | BODY MASS INDEX: 25.78 KG/M2 | TEMPERATURE: 97 F | HEART RATE: 58 BPM | OXYGEN SATURATION: 99 % | WEIGHT: 151 LBS

## 2022-05-19 DIAGNOSIS — M54.41 CHRONIC BILATERAL LOW BACK PAIN WITH BILATERAL SCIATICA: Primary | ICD-10-CM

## 2022-05-19 DIAGNOSIS — G89.29 CHRONIC BILATERAL LOW BACK PAIN WITH BILATERAL SCIATICA: ICD-10-CM

## 2022-05-19 DIAGNOSIS — M54.42 CHRONIC BILATERAL LOW BACK PAIN WITH BILATERAL SCIATICA: ICD-10-CM

## 2022-05-19 DIAGNOSIS — G89.29 CHRONIC BILATERAL LOW BACK PAIN WITH BILATERAL SCIATICA: Primary | ICD-10-CM

## 2022-05-19 DIAGNOSIS — M79.18 CHRONIC MYOFASCIAL PAIN: ICD-10-CM

## 2022-05-19 DIAGNOSIS — M54.41 CHRONIC BILATERAL LOW BACK PAIN WITH BILATERAL SCIATICA: ICD-10-CM

## 2022-05-19 DIAGNOSIS — M54.42 CHRONIC BILATERAL LOW BACK PAIN WITH BILATERAL SCIATICA: Primary | ICD-10-CM

## 2022-05-19 DIAGNOSIS — G89.29 CHRONIC MYOFASCIAL PAIN: ICD-10-CM

## 2022-05-19 PROCEDURE — 99213 OFFICE O/P EST LOW 20 MIN: CPT | Performed by: NURSE PRACTITIONER

## 2022-05-19 PROCEDURE — 99215 OFFICE O/P EST HI 40 MIN: CPT

## 2022-05-19 RX ORDER — METHOCARBAMOL 500 MG/1
500 TABLET, FILM COATED ORAL 3 TIMES DAILY
Qty: 90 TABLET | Refills: 5 | Status: SHIPPED | OUTPATIENT
Start: 2022-05-19 | End: 2022-08-17

## 2022-05-19 RX ORDER — HYDROCODONE BITARTRATE AND ACETAMINOPHEN 7.5; 325 MG/1; MG/1
1 TABLET ORAL EVERY 8 HOURS PRN
Qty: 90 TABLET | Refills: 0 | Status: SHIPPED | OUTPATIENT
Start: 2022-05-19 | End: 2022-06-14 | Stop reason: SDUPTHER

## 2022-05-19 ASSESSMENT — ENCOUNTER SYMPTOMS
BACK PAIN: 1
CONSTIPATION: 0
BOWEL INCONTINENCE: 0

## 2022-05-19 ASSESSMENT — PAIN SCALES - GENERAL: PAINLEVEL_OUTOF10: 5

## 2022-05-19 ASSESSMENT — PAIN DESCRIPTION - LOCATION: LOCATION: BACK;NECK

## 2022-05-19 NOTE — PROGRESS NOTES
Clinic Documentation      Education Provided:  [x] Review of Mando Lima  [x] Agreement Review  [x] PEG Score Calculated [x] PHQ Score Calculated [x] ORT Score Calculated    [] Compliance Issues Discussed [] Cognitive Behavior Needs [x] Exercise [] Review of Test [] Financial Issues  [x] Tobacco/Alcohol Use Reviewed [x] Teaching [] New Patient [] Picture Obtained    Physician Plan:  [] Outgoing Referral  [] Pharmacy Consult  [] Test Ordered [x] Prescription Ordered/Changed   [] Obtained Test Results / Consult Notes        Complete if patient is withholding blood thinner for procedure     Blood Thinner Patient is currently taking:      [] Plavix (Hold for 7 days)  [] Aspirin (Hold for 5 days)     [] Pletal (Hold for 2 days)  [] Pradaxa (Hold for 3 days)    [] Effient (Hold for 7 days)  [] Xarelto (Hold for 2 days)    [] Eliquis (Hold for 2 days)  [] Brilinta (Hold for 7 days)    [] Coumadin (Hold for 5 days) - (INR needs to be drawn the day prior to procedure- INR < 2.0)    [] Aggrenox (Hold for 7 days)        [] Patient will stop medication on their own.    [] Blood Thinner Form Faxed for approval to hold.    Provider form faxed to:   Assessment Completed by:  Electronically signed by Ralph Hester MA on 5/19/2022 at 8:43 AM

## 2022-05-19 NOTE — TELEPHONE ENCOUNTER
Requested Prescriptions     Signed Prescriptions Disp Refills    methocarbamol (ROBAXIN) 500 MG tablet 90 tablet 5     Sig: Take 1 tablet by mouth 3 times daily     Authorizing Provider: Dipesh Oden

## 2022-05-19 NOTE — PROGRESS NOTES
[facility] Physical & Pain Medicine    Office Visit    Patient Name: Adilson Jacob    MR #: [id number]    Account [de-identified]    : 1965    Age: 49 y.o. Sex: female    Date: 2022    PCP: Tisha Mejía    Chief Complaint:   Chief Complaint   Patient presents with    Neck Pain    Hip Pain       History of Present Illness: The patient is a 49 y.o. female who presents for 3 month follow up. Patient has lost a total of 115#'s Patient is trying maintain her weight loss. Patient had oral surgery on Monday. Back Pain  This is a chronic problem. The current episode started more than 1 year ago. The problem occurs constantly. The problem has been waxing and waning since onset. The pain is present in the lumbar spine, sacro-iliac and thoracic spine. The quality of the pain is described as shooting and aching (shooting pain on left side). Radiates to: bilateral hips. The symptoms are aggravated by bending, sitting, standing and stress (walking makes left side worse). Associated symptoms include numbness (left) and tingling (left). Pertinent negatives include no bladder incontinence, bowel incontinence or weakness. Neck Pain   Associated symptoms include numbness (left) and tingling (left). Pertinent negatives include no weakness. Hip Pain  Associated symptoms include arthralgias, myalgias, neck pain and numbness (left). Pertinent negatives include no weakness.        Screening Tools:    PE.6    Past PE    ORT: 0    PHQ-9: 2    Current Pain Assessment  Pain Assessment  Pain Assessment: 0-10  Pain Level: 5  Patient's Stated Pain Goal: 2  Pain Location: Back,Neck      Past Medical History  Past Medical History:   Diagnosis Date    Arthritis     Depression     Hypertension     Indigestion     Rheumatoid arthritis (HCC)        Allergies  Codeine    Current Medications  Current Outpatient Medications   Medication Sig Dispense Refill    HYDROcodone-acetaminophen (NORCO) 7.5-325 MG per tablet Take 1 tablet by mouth every 8 hours as needed for Pain for up to 3 days. (may fill 22, enough til appt) 9 tablet 0    pantoprazole (PROTONIX) 40 MG tablet Take 40 mg by mouth every morning (before breakfast)      escitalopram (LEXAPRO) 20 MG tablet Take 1 tablet by mouth daily  5    methotrexate (RHEUMATREX) 2.5 MG chemo tablet Take 6 tablets by mouth once a week  0    folic acid (FOLVITE) 1 MG tablet Take 1 tablet by mouth daily  0    ENBREL SURECLICK 50 MG/ML SOAJ Inject 50 mg into the skin once a week  0    Naproxen Sodium (ALEVE) 220 MG CAPS Take by mouth as needed for Pain       No current facility-administered medications for this encounter. Social History    Social History     Socioeconomic History    Marital status:      Spouse name: Not on file    Number of children: Not on file    Years of education: Not on file    Highest education level: Not on file   Occupational History    Not on file   Tobacco Use    Smoking status: Former Smoker     Packs/day: 1.00     Types: Cigarettes     Quit date: 2019     Years since quittin.8    Smokeless tobacco: Never Used   Vaping Use    Vaping Use: Every day    Substances: Always   Substance and Sexual Activity    Alcohol use: No    Drug use: No    Sexual activity: Not on file   Other Topics Concern    Not on file   Social History Narrative    Not on file     Social Determinants of Health     Financial Resource Strain:     Difficulty of Paying Living Expenses: Not on file   Food Insecurity:     Worried About Running Out of Food in the Last Year: Not on file    Ramos of Food in the Last Year: Not on file   Transportation Needs:     Lack of Transportation (Medical): Not on file    Lack of Transportation (Non-Medical):  Not on file   Physical Activity:     Days of Exercise per Week: Not on file    Minutes of Exercise per Session: Not on file   Stress:     Feeling of Stress : Not on file   Social Connections:     Frequency of Communication with Friends and Family: Not on file    Frequency of Social Gatherings with Friends and Family: Not on file    Attends Spiritism Services: Not on file    Active Member of Clubs or Organizations: Not on file    Attends Club or Organization Meetings: Not on file    Marital Status: Not on file   Intimate Partner Violence:     Fear of Current or Ex-Partner: Not on file    Emotionally Abused: Not on file    Physically Abused: Not on file    Sexually Abused: Not on file   Housing Stability:     Unable to Pay for Housing in the Last Year: Not on file    Number of [place] in the Last Year: Not on file    Unstable Housing in the Last Year: Not on file         Family History  family history is not on file. Review of Systems:  Review of Systems   Constitutional: Negative for activity change. Gastrointestinal: Negative for bowel incontinence and constipation. Genitourinary: Negative for bladder incontinence. Musculoskeletal: Positive for arthralgias, back pain, gait problem, myalgias and neck pain. Neurological: Positive for tingling (left) and numbness (left). Negative for weakness. Psychiatric/Behavioral: Negative for agitation, self-injury and suicidal ideas. The patient is not nervous/anxious. 14 point ROS negative besides that noted in HPI    Physical exam:     Patient Vitals for the past 24 hrs:   Temp Temp src Pulse SpO2 Height Weight   05/19/22 0917 97 °F (36.1 °C) Temporal 58 99 % 5' 4\" (1.626 m) 151 lb (68.5 kg)       Body mass index is 25.92 kg/m². Physical Exam  Vitals and nursing note reviewed. Constitutional:       General: She is not in acute distress. Appearance: She is well-developed. HENT:      Head: Normocephalic.       Right Ear: External ear normal.      Left Ear: External ear normal.      Nose: Nose normal.   Eyes:      Conjunctiva/sclera: Conjunctivae normal.      Pupils: Pupils are equal, round, and reactive to light.   Neck:      Vascular: No JVD. Trachea: No tracheal deviation. Cardiovascular:      Rate and Rhythm: Normal rate. Pulmonary:      Effort: Pulmonary effort is normal.   Abdominal:      General: There is no distension. Tenderness: There is no abdominal tenderness. Musculoskeletal:      Thoracic back: Spasms and tenderness present. Lumbar back: Spasms and tenderness (Bilateral SI joint TTP) present. Decreased range of motion. Negative right straight leg raise test and negative left straight leg raise test.   Skin:     General: Skin is warm and dry. Neurological:      Mental Status: She is alert and oriented to person, place, and time. Cranial Nerves: No cranial nerve deficit. Psychiatric:         Behavior: Behavior normal.         Thought Content: Thought content normal.         Judgment: Judgment normal.           LABS:     No results found for: NA, K, CL, CO2, BUN, CREATININE, GLUCOSE, CALCIUM     No results found for: WBC, HGB, HCT, MCV, PLT    Assessment:                                                                                                                                        Active Problems:    Chronic bilateral low back pain with bilateral sciatica    Sacroiliitis (HCC)    Arthritis    Trochanteric bursitis of left hip    Chronic myofascial pain    Piriformis syndrome of both sides    Pain management contract agreement  Resolved Problems:    * No resolved hospital problems. *      PLAN:  Chronic myofascial pain  Continue medication with refill sent at appointment see refill encounter.    - methocarbamol (ROBAXIN) 500 MG tablet; Take 1 tablet by mouth 3 times daily  Dispense: 90 tablet; Refill: 2    Chronic bilateral low back pain with bilateral sciatica    Continue medication with refill sent at appointment see refill encounter. HYDROcodone-acetaminophen (NORCO) 7.5-325 MG per tablet; Take 1 tablet by mouth every 8 hours as needed for Pain for up to 30 days. Intended supply: 30 days  Dispense: 90 tablet; Refill: 0      [x] Follow up    [] 4 weeks   [] 6-8 weeks   [] 10-12 weeks   [x] 3 months  [] Post procedure to evaluate effectiveness of treatment  [] To evaluate medications changes made at office visit. [] To review diagnostics ordered at last visit. [] To evaluate response to therapy    [x] For management of controlled substance  [x] Random UDS as indicated by ORT score or if indicated by abberent behaviors    Discussion: Discussed exam findings and plan of care with patient. Patient agreed with POC and questions were asked and answered. Activity: discussed exercise as beneficial to pain reduction, encouraged stretching exercise with a focus on torso strengthening. Goals:  Pain Management Goals of Therapy:   [] Resolution in pain  [x] Decrease in pain level  [x] Improvement in ADL's  [x] Increase in activities with less pain  [] Decrease in medication      Controlled substance monitoring:    [x] Discussed medication side effects, risk of tolerance and/or dependence, and/or alternative treatment  [] Discussed the detrimental effects of long term narcotic use in younger patients especially women of childbearing years.   [x] No signs and symptoms of potential drug abuse or diversion were identified  [] Signs of potential drug abuse or diversion were identified   [] ORT Score   [] UDS non-compliant   [] See Note  [x] Random urine drug screen sent today   [] Random urine drug screen not completed today   [] Deferred New Patient  [x] Compliant  2/22/2021  [] Not Compliant see note  [] Medication agreement with provider signed today   [x] Medication agreement with provider on file under media 2/22/2021  [] Medication regimen effective with c/o of side effects noted and continued   [] New patient continuing current medication while developing POC   [x] On going assessment and evaluation of medication regimen  [x] Medication regimen not effective see plan for changes  [x] Vidal Patterson reviewed & on file under media     CC:  Eddi Kong, EDU - CNP, 5/19/2022 at 9:43 AM    EMR dragon/transcription disclaimer: Much of this encounter note is electronic transcription/translation of spoken language to printed tach. Electronic translation of spoken language may be erroneous, or at times, nonsensical words or phrases may be inadvertently transcribed.  Although, I have reviewed the note for such errors, some may still exist.

## 2022-06-14 DIAGNOSIS — G89.29 CHRONIC BILATERAL LOW BACK PAIN WITH BILATERAL SCIATICA: ICD-10-CM

## 2022-06-14 DIAGNOSIS — M54.42 CHRONIC BILATERAL LOW BACK PAIN WITH BILATERAL SCIATICA: ICD-10-CM

## 2022-06-14 DIAGNOSIS — M54.41 CHRONIC BILATERAL LOW BACK PAIN WITH BILATERAL SCIATICA: ICD-10-CM

## 2022-06-14 RX ORDER — HYDROCODONE BITARTRATE AND ACETAMINOPHEN 7.5; 325 MG/1; MG/1
1 TABLET ORAL EVERY 8 HOURS PRN
Qty: 90 TABLET | Refills: 0 | Status: SHIPPED | OUTPATIENT
Start: 2022-06-18 | End: 2022-07-12 | Stop reason: SDUPTHER

## 2022-07-12 DIAGNOSIS — M54.41 CHRONIC BILATERAL LOW BACK PAIN WITH BILATERAL SCIATICA: ICD-10-CM

## 2022-07-12 DIAGNOSIS — G89.29 CHRONIC BILATERAL LOW BACK PAIN WITH BILATERAL SCIATICA: ICD-10-CM

## 2022-07-12 DIAGNOSIS — M54.42 CHRONIC BILATERAL LOW BACK PAIN WITH BILATERAL SCIATICA: ICD-10-CM

## 2022-07-14 RX ORDER — HYDROCODONE BITARTRATE AND ACETAMINOPHEN 7.5; 325 MG/1; MG/1
1 TABLET ORAL EVERY 8 HOURS PRN
Qty: 90 TABLET | Refills: 0 | Status: SHIPPED | OUTPATIENT
Start: 2022-07-18 | End: 2022-08-17 | Stop reason: SDUPTHER

## 2022-08-15 DIAGNOSIS — M54.42 CHRONIC BILATERAL LOW BACK PAIN WITH BILATERAL SCIATICA: ICD-10-CM

## 2022-08-15 DIAGNOSIS — G89.29 CHRONIC BILATERAL LOW BACK PAIN WITH BILATERAL SCIATICA: ICD-10-CM

## 2022-08-15 DIAGNOSIS — M54.41 CHRONIC BILATERAL LOW BACK PAIN WITH BILATERAL SCIATICA: ICD-10-CM

## 2022-08-17 RX ORDER — HYDROCODONE BITARTRATE AND ACETAMINOPHEN 7.5; 325 MG/1; MG/1
1 TABLET ORAL EVERY 8 HOURS PRN
Qty: 90 TABLET | Refills: 0 | Status: SHIPPED | OUTPATIENT
Start: 2022-08-17 | End: 2022-09-14 | Stop reason: SDUPTHER

## 2022-09-06 ENCOUNTER — HOSPITAL ENCOUNTER (OUTPATIENT)
Dept: GENERAL RADIOLOGY | Facility: HOSPITAL | Age: 57
Discharge: HOME | End: 2022-09-06
Admitting: NURSE PRACTITIONER

## 2022-09-06 DIAGNOSIS — R10.11 RUQ PAIN: ICD-10-CM

## 2022-09-06 PROCEDURE — 74240 X-RAY XM UPR GI TRC 1CNTRST: CPT

## 2022-09-06 RX ADMIN — BARIUM SULFATE 200 ML: 960 POWDER, FOR SUSPENSION ORAL at 08:53

## 2022-09-12 ENCOUNTER — HOSPITAL ENCOUNTER (OUTPATIENT)
Dept: CT IMAGING | Facility: HOSPITAL | Age: 57
Discharge: HOME | End: 2022-09-12
Admitting: NURSE PRACTITIONER

## 2022-09-12 DIAGNOSIS — I71.9 AORTIC ANEURYSM WITHOUT RUPTURE, UNSPECIFIED PORTION OF AORTA: ICD-10-CM

## 2022-09-12 PROCEDURE — 71250 CT THORAX DX C-: CPT

## 2022-09-13 DIAGNOSIS — M54.41 CHRONIC BILATERAL LOW BACK PAIN WITH BILATERAL SCIATICA: ICD-10-CM

## 2022-09-13 DIAGNOSIS — M54.42 CHRONIC BILATERAL LOW BACK PAIN WITH BILATERAL SCIATICA: ICD-10-CM

## 2022-09-13 DIAGNOSIS — G89.29 CHRONIC BILATERAL LOW BACK PAIN WITH BILATERAL SCIATICA: ICD-10-CM

## 2022-09-14 RX ORDER — HYDROCODONE BITARTRATE AND ACETAMINOPHEN 7.5; 325 MG/1; MG/1
1 TABLET ORAL EVERY 8 HOURS PRN
Qty: 90 TABLET | Refills: 0 | Status: SHIPPED | OUTPATIENT
Start: 2022-09-16 | End: 2022-10-04 | Stop reason: SDUPTHER

## 2022-10-04 ENCOUNTER — HOSPITAL ENCOUNTER (OUTPATIENT)
Dept: PAIN MANAGEMENT | Age: 57
Discharge: HOME | End: 2022-10-04
Payer: MEDICARE

## 2022-10-04 VITALS
RESPIRATION RATE: 16 BRPM | WEIGHT: 145 LBS | TEMPERATURE: 97.3 F | BODY MASS INDEX: 24.75 KG/M2 | OXYGEN SATURATION: 96 % | HEART RATE: 52 BPM | HEIGHT: 64 IN | DIASTOLIC BLOOD PRESSURE: 87 MMHG | SYSTOLIC BLOOD PRESSURE: 144 MMHG

## 2022-10-04 DIAGNOSIS — G89.29 CHRONIC BILATERAL LOW BACK PAIN WITH BILATERAL SCIATICA: ICD-10-CM

## 2022-10-04 DIAGNOSIS — G89.29 CHRONIC MYOFASCIAL PAIN: ICD-10-CM

## 2022-10-04 DIAGNOSIS — G57.03 PIRIFORMIS SYNDROME OF BOTH SIDES: Primary | ICD-10-CM

## 2022-10-04 DIAGNOSIS — M54.41 CHRONIC BILATERAL LOW BACK PAIN WITH BILATERAL SCIATICA: ICD-10-CM

## 2022-10-04 DIAGNOSIS — M79.18 CHRONIC MYOFASCIAL PAIN: ICD-10-CM

## 2022-10-04 DIAGNOSIS — M54.42 CHRONIC BILATERAL LOW BACK PAIN WITH BILATERAL SCIATICA: ICD-10-CM

## 2022-10-04 PROCEDURE — 99213 OFFICE O/P EST LOW 20 MIN: CPT

## 2022-10-04 RX ORDER — METHOCARBAMOL 500 MG/1
500 TABLET, FILM COATED ORAL 3 TIMES DAILY PRN
Qty: 90 TABLET | Refills: 5 | Status: SHIPPED | OUTPATIENT
Start: 2022-10-04

## 2022-10-04 RX ORDER — HYDROCODONE BITARTRATE AND ACETAMINOPHEN 7.5; 325 MG/1; MG/1
1 TABLET ORAL EVERY 8 HOURS PRN
Qty: 90 TABLET | Refills: 0 | Status: SHIPPED | OUTPATIENT
Start: 2022-10-15 | End: 2022-11-14

## 2022-10-04 RX ORDER — METHOCARBAMOL 500 MG/1
1 TABLET, FILM COATED ORAL 3 TIMES DAILY PRN
COMMUNITY
Start: 2021-06-29 | End: 2022-10-04 | Stop reason: SDUPTHER

## 2022-10-04 RX ORDER — ERGOCALCIFEROL 1.25 MG/1
1 CAPSULE ORAL WEEKLY
COMMUNITY
Start: 2022-09-10

## 2022-10-04 ASSESSMENT — PAIN DESCRIPTION - ORIENTATION
ORIENTATION: LOWER
ORIENTATION_2: LOWER
ORIENTATION_3: RIGHT;LEFT

## 2022-10-04 ASSESSMENT — PAIN DESCRIPTION - LOCATION
LOCATION_2: NECK
LOCATION: BACK
LOCATION_3: HIP

## 2022-10-04 ASSESSMENT — PAIN SCALES - GENERAL: PAINLEVEL_OUTOF10: 5

## 2022-10-04 ASSESSMENT — PAIN DESCRIPTION - INTENSITY
RATING_2: 0
RATING_3: 3

## 2022-10-04 ASSESSMENT — PAIN DESCRIPTION - PAIN TYPE: TYPE: CHRONIC PAIN

## 2022-10-04 NOTE — PROGRESS NOTES
Clinic Documentation      Education Provided:  [x] Review of Rochelle Azul  [] Agreement Review  [x] PEG Score Calculated [] PHQ Score Calculated [] ORT Score Calculated    [] Compliance Issues Discussed [] Cognitive Behavior Needs [x] Exercise [] Review of Test [] Financial Issues  [x] Tobacco/Alcohol Use Reviewed [x] Teaching [] New Patient [] Picture Obtained    Physician Plan:  [] Outgoing Referral  [] Pharmacy Consult  [] Test Ordered [x] Prescription Ordered/Changed   [] Obtained Test Results / Consult Notes        Complete if patient is withholding blood thinner for procedure     Blood Thinner Patient is currently taking:      [] Plavix (Hold for 7 days)  [] Aspirin (Hold for 5 days)     [] Pletal (Hold for 2 days)  [] Pradaxa (Hold for 3 days)    [] Effient (Hold for 7 days)  [] Xarelto (Hold for 2 days)    [] Eliquis (Hold for 2 days)  [] Brilinta (Hold for 7 days)    [] Coumadin (Hold for 5 days) - (INR needs to be drawn the day prior to procedure- INR < 2.0)    [] Aggrenox (Hold for 7 days)        [] Patient will stop medication on their own.    [] Blood Thinner Form Faxed for approval to hold.    Provider form faxed to:     Assessment Completed by:  Electronically signed by Caryle Sprung, RN on 10/4/2022 at 11:54 AM

## 2022-10-04 NOTE — PROGRESS NOTES
[facility] Physical & Pain Medicine    Office Visit    Patient Name: Rose Mary Quezada    MR #: [id number]    Account [de-identified]    : 1965    Age: 49 y.o. Sex: female    Date: 10/4/2022    PCP: Shaq Eastman    Chief Complaint:   Chief Complaint   Patient presents with    Back Pain    Neck Pain    Hip Pain     bilateral       History of Present Illness: The patient is a 49 y.o. female who presents for 3 month follow up. Patient's weight loss has stabilized. Patient is feeling really well. Her pain is more controlled with weight loss and increase activity. She is able to play with grandchildren     Back Pain  This is a chronic problem. The current episode started more than 1 year ago. The problem occurs constantly. The problem has been waxing and waning since onset. The pain is present in the lumbar spine, sacro-iliac and thoracic spine. The quality of the pain is described as shooting and aching (shooting pain on left side). Radiates to: bilateral hips. The symptoms are aggravated by bending, sitting, standing and stress (walking makes left side worse).      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 (May fill 9/16/22) 90 tablet 0    pantoprazole (PROTONIX) 40 MG tablet Take 40 mg by mouth every morning (before breakfast)      escitalopram (LEXAPRO) 20 MG tablet Take 1 tablet by mouth daily  5    methotrexate (RHEUMATREX) 2.5 MG chemo tablet Take 6 tablets by mouth once a week  0    folic acid (FOLVITE) 1 MG tablet Take 1 tablet by mouth daily  0    ENBREL SURECLICK 50 MG/ML SOAJ Inject 50 mg into the skin once a week  0     No current facility-administered medications for this encounter. Social History    Social History     Socioeconomic History    Marital status:      Spouse name: None    Number of children: 1    Years of education: None    Highest education level: None   Tobacco Use    Smoking status: Former     Packs/day: 1.00     Types: Cigarettes     Quit date: 7/12/2019     Years since quitting: 3.2    Smokeless tobacco: Never   Vaping Use    Vaping Use: Former   Substance and Sexual Activity    Alcohol use: No    Drug use: No         Family History  family history is not on file. Review of Systems:  Review of Systems     14 point ROS negative besides that noted in HPI    Physical exam:     Patient Vitals for the past 24 hrs:   BP Temp Temp src Pulse Resp SpO2 Height Weight   10/04/22 1051 (!) 144/87 97.3 °F (36.3 °C) Temporal 52 16 96 % 5' 4\" (1.626 m) 145 lb (65.8 kg)         Body mass index is 24.89 kg/m². Physical Exam  Vitals and nursing note reviewed. Constitutional:       General: She is not in acute distress. Appearance: She is well-developed. HENT:      Head: Normocephalic. Right Ear: External ear normal.      Left Ear: External ear normal.      Nose: Nose normal.   Eyes:      Conjunctiva/sclera: Conjunctivae normal.      Pupils: Pupils are equal, round, and reactive to light. Neck:      Vascular: No JVD. Trachea: No tracheal deviation.    Pulmonary:      Effort: Pulmonary effort is normal.   Abdominal: General: There is no distension. Tenderness: There is no abdominal tenderness. Musculoskeletal:      Thoracic back: Spasms and tenderness present. Lumbar back: Spasms and tenderness (Bilateral SI joint TTP) present. Decreased range of motion. Negative right straight leg raise test and negative left straight leg raise test.   Skin:     General: Skin is warm and dry. Neurological:      Mental Status: She is alert and oriented to person, place, and time. Cranial Nerves: No cranial nerve deficit. Psychiatric:         Behavior: Behavior normal.         Thought Content: Thought content normal.         Judgment: Judgment normal.         LABS:     No results found for: NA, K, CL, CO2, BUN, CREATININE, GLUCOSE, CALCIUM     No results found for: WBC, HGB, HCT, MCV, PLT    Assessment:                                                                                                                                        Active Problems:    Chronic bilateral low back pain with bilateral sciatica    Sacroiliitis (HCC)    Arthritis    Trochanteric bursitis of left hip    Chronic myofascial pain    Piriformis syndrome of both sides    Pain management contract agreement  Resolved Problems:    * No resolved hospital problems. *      PLAN:  Chronic bilateral low back pain with bilateral sciatica  - HYDROcodone-acetaminophen (NORCO) 7.5-325 MG per tablet; Take 1 tablet by mouth every 8 hours as needed for Pain for up to 30 days. Dispense: 90 tablet; Refill: 0     2. Piriformis syndrome of both sides  - methocarbamol (ROBAXIN) 500 MG tablet; Take 1 tablet by mouth 3 times daily as needed (muscle spasms)  Dispense: 90 tablet; Refill: 5     3. Chronic myofascial pain  - methocarbamol (ROBAXIN) 500 MG tablet; Take 1 tablet by mouth 3 times daily as needed (muscle spasms)  Dispense: 90 tablet; Refill: 5     Patient to continue daily exercises focusing on core muscles.  Current treatment plan is effective allowing patient to meet treatment goals. [x] Follow up    [] 4 weeks   [] 6-8 weeks   [] 10-12 weeks   [x] 3 months  [] Post procedure to evaluate effectiveness of treatment  [] To evaluate medications changes made at office visit. [] To review diagnostics ordered at last visit. [] To evaluate response to therapy    [x] For management of controlled substance  [x] Random UDS as indicated by ORT score or if indicated by abberent behaviors    Discussion: Discussed exam findings and plan of care with patient. Patient agreed with POC and questions were asked and answered. Activity: discussed exercise as beneficial to pain reduction, encouraged stretching exercise with a focus on torso strengthening. Goals:  Pain Management Goals of Therapy:   [] Resolution in pain  [x] Decrease in pain level  [x] Improvement in ADL's  [x] Increase in activities with less pain  [] Decrease in medication      Controlled substance monitoring:    [x] Discussed medication side effects, risk of tolerance and/or dependence, and/or alternative treatment  [] Discussed the detrimental effects of long term narcotic use in younger patients especially women of childbearing years.   [x] No signs and symptoms of potential drug abuse or diversion were identified  [] Signs of potential drug abuse or diversion were identified   [] ORT Score   [] UDS non-compliant   [] See Note  [x] Random urine drug screen sent today   [] Random urine drug screen not completed today   [] Deferred New Patient  [x] Compliant  2/22/2021  [] Not Compliant see note  [] Medication agreement with provider signed today   [x] Medication agreement with provider on file under media 2/22/2021  [x] Medication regimen effective with c/o of side effects noted and continued   [] New patient continuing current medication while developing POC   [] On going assessment and evaluation of medication regimen  [] Medication regimen not effective see plan for changes  [x] Mercer Cooks reviewed & on file under media     CC:  Andrea Bond, EDU - CNP, 10/4/2022 at 11:37 AM    EMR dragon/transcription disclaimer: Much of this encounter note is electronic transcription/translation of spoken language to printed tach. Electronic translation of spoken language may be erroneous, or at times, nonsensical words or phrases may be inadvertently transcribed.  Although, I have reviewed the note for such errors, some may still exist.

## 2022-10-04 NOTE — TELEPHONE ENCOUNTER
1. Chronic bilateral low back pain with bilateral sciatica  - HYDROcodone-acetaminophen (NORCO) 7.5-325 MG per tablet; Take 1 tablet by mouth every 8 hours as needed for Pain for up to 30 days. Dispense: 90 tablet; Refill: 0    2. Piriformis syndrome of both sides  - methocarbamol (ROBAXIN) 500 MG tablet; Take 1 tablet by mouth 3 times daily as needed (muscle spasms)  Dispense: 90 tablet; Refill: 5    3. Chronic myofascial pain  - methocarbamol (ROBAXIN) 500 MG tablet; Take 1 tablet by mouth 3 times daily as needed (muscle spasms)  Dispense: 90 tablet;  Refill: 5

## 2022-10-20 ENCOUNTER — TRANSCRIBE ORDERS (OUTPATIENT)
Dept: LAB | Facility: HOSPITAL | Age: 57
End: 2022-10-20

## 2022-10-20 ENCOUNTER — LAB (OUTPATIENT)
Dept: LAB | Facility: HOSPITAL | Age: 57
End: 2022-10-20

## 2022-10-20 DIAGNOSIS — Z79.899 ENCOUNTER FOR LONG-TERM (CURRENT) USE OF OTHER MEDICATIONS: ICD-10-CM

## 2022-10-20 DIAGNOSIS — Z79.899 ENCOUNTER FOR LONG-TERM (CURRENT) USE OF OTHER MEDICATIONS: Primary | ICD-10-CM

## 2022-10-20 PROCEDURE — 86480 TB TEST CELL IMMUN MEASURE: CPT

## 2022-10-20 PROCEDURE — 36415 COLL VENOUS BLD VENIPUNCTURE: CPT

## 2022-10-22 LAB
GAMMA INTERFERON BACKGROUND BLD IA-ACNC: 0.03 IU/ML
M TB IFN-G BLD-IMP: NEGATIVE
M TB IFN-G CD4+ BCKGRND COR BLD-ACNC: 0.03 IU/ML
M TB IFN-G CD4+CD8+ BCKGRND COR BLD-ACNC: 0.02 IU/ML
MITOGEN IGNF BLD-ACNC: >10 IU/ML
QUANTIFERON INCUBATION: NORMAL
SERVICE CMNT-IMP: NORMAL

## 2022-11-15 DIAGNOSIS — M54.41 CHRONIC BILATERAL LOW BACK PAIN WITH BILATERAL SCIATICA: ICD-10-CM

## 2022-11-15 DIAGNOSIS — G89.29 CHRONIC BILATERAL LOW BACK PAIN WITH BILATERAL SCIATICA: ICD-10-CM

## 2022-11-15 DIAGNOSIS — M54.42 CHRONIC BILATERAL LOW BACK PAIN WITH BILATERAL SCIATICA: ICD-10-CM

## 2022-11-16 RX ORDER — HYDROCODONE BITARTRATE AND ACETAMINOPHEN 7.5; 325 MG/1; MG/1
1 TABLET ORAL EVERY 8 HOURS PRN
Qty: 90 TABLET | Refills: 0 | Status: SHIPPED | OUTPATIENT
Start: 2022-12-15 | End: 2023-01-14

## 2022-11-16 RX ORDER — HYDROCODONE BITARTRATE AND ACETAMINOPHEN 7.5; 325 MG/1; MG/1
1 TABLET ORAL EVERY 8 HOURS PRN
Qty: 90 TABLET | Refills: 0 | Status: SHIPPED | OUTPATIENT
Start: 2022-11-16 | End: 2022-12-16

## 2023-01-12 DIAGNOSIS — M54.42 CHRONIC BILATERAL LOW BACK PAIN WITH BILATERAL SCIATICA: ICD-10-CM

## 2023-01-12 DIAGNOSIS — G89.29 CHRONIC BILATERAL LOW BACK PAIN WITH BILATERAL SCIATICA: ICD-10-CM

## 2023-01-12 DIAGNOSIS — M54.41 CHRONIC BILATERAL LOW BACK PAIN WITH BILATERAL SCIATICA: ICD-10-CM

## 2023-01-12 RX ORDER — HYDROCODONE BITARTRATE AND ACETAMINOPHEN 7.5; 325 MG/1; MG/1
1 TABLET ORAL EVERY 8 HOURS PRN
Qty: 90 TABLET | Refills: 0 | Status: SHIPPED | OUTPATIENT
Start: 2023-01-15 | End: 2023-02-14

## 2023-01-16 ENCOUNTER — HOSPITAL ENCOUNTER (OUTPATIENT)
Dept: PAIN MANAGEMENT | Age: 58
Discharge: HOME | End: 2023-01-16
Payer: MEDICARE

## 2023-01-16 VITALS
DIASTOLIC BLOOD PRESSURE: 84 MMHG | TEMPERATURE: 97.6 F | SYSTOLIC BLOOD PRESSURE: 138 MMHG | HEART RATE: 67 BPM | HEIGHT: 64 IN | OXYGEN SATURATION: 94 % | BODY MASS INDEX: 24.92 KG/M2 | RESPIRATION RATE: 16 BRPM | WEIGHT: 146 LBS

## 2023-01-16 PROCEDURE — 99213 OFFICE O/P EST LOW 20 MIN: CPT

## 2023-01-16 PROCEDURE — 99214 OFFICE O/P EST MOD 30 MIN: CPT | Performed by: NURSE PRACTITIONER

## 2023-01-16 ASSESSMENT — PAIN SCALES - GENERAL: PAINLEVEL_OUTOF10: 6

## 2023-01-16 ASSESSMENT — PAIN DESCRIPTION - PAIN TYPE: TYPE: CHRONIC PAIN

## 2023-01-16 ASSESSMENT — ENCOUNTER SYMPTOMS
CONSTIPATION: 0
BACK PAIN: 1

## 2023-01-16 ASSESSMENT — PAIN DESCRIPTION - ORIENTATION: ORIENTATION: LOWER

## 2023-01-16 ASSESSMENT — PAIN DESCRIPTION - LOCATION: LOCATION: BACK

## 2023-01-16 NOTE — TELEPHONE ENCOUNTER
Patient cancelled an appointment on 3/1/22. Rescheduled for 5/19/22 with Royal Armas. Body Location Override (Optional - Billing Will Still Be Based On Selected Body Map Location If Applicable): left lower abdomen Detail Level: Detailed Was A Bandage Applied: Yes Punch Size In Mm: 3 Size Of Lesion In Cm (Optional): 0.6 X Size Of Lesion In Cm (Optional): 0 Depth Of Punch Biopsy: dermis Biopsy Type: H and E Anesthesia Type: 2% lidocaine with epinephrine Anesthesia Volume In Cc (Will Not Render If 0): 0.5 Hemostasis: None Epidermal Sutures: none, closed by secondary intention Wound Care: Petrolatum Dressing: bandage Patient Will Remove Sutures At Home?: No Lab: [facility] Facility: [address] [place] Consent: Written consent was obtained and risks were reviewed including but not limited to scarring, infection, bleeding, scabbing, incomplete removal, nerve damage and allergy to anesthesia. Post-Care Instructions: I reviewed with the patient in detail post-care instructions. Patient is to keep the biopsy site dry overnight, and then apply bacitracin twice daily until healed. Patient may apply hydrogen peroxide soaks to remove any crusting.  Written instructions provided Home Suture Removal Text: Patient was provided a home suture removal kit and will remove their sutures at home. If they have any questions or difficulties they will call the office. Notification Instructions: Patient will be notified of biopsy results. However, patient instructed to call the office if not contacted within 2 weeks. Billing Type: [employer] Information: Selecting Yes will display possible errors in your note based on the variables you have selected. This validation is only offered as a suggestion for you. PLEASE NOTE THAT THE VALIDATION TEXT WILL BE REMOVED WHEN YOU FINALIZE YOUR NOTE. IF YOU WANT TO FAX A PRELIMINARY NOTE YOU WILL NEED TO TOGGLE THIS TO 'NO' IF YOU DO NOT WANT IT IN YOUR FAXED NOTE.

## 2023-01-16 NOTE — PROGRESS NOTES
Clinic Documentation      Education Provided:  [x] Review of Chaparrita Elkins  [] Agreement Review  [x] PEG Score Calculated [] PHQ Score Calculated [] ORT Score Calculated    [] Compliance Issues Discussed [] Cognitive Behavior Needs [x] Exercise [] Review of Test [] Financial Issues  [x] Tobacco/Alcohol Use Reviewed [x] Teaching [] New Patient [] Picture Obtained    Physician Plan:  [] Outgoing Referral  [] Pharmacy Consult  [] Test Ordered [x] Prescription Ordered/Changed   [] Obtained Test Results / Consult Notes        Complete if patient is withholding blood thinner for procedure     Blood Thinner Patient is currently taking:      [] Plavix (Hold for 7 days)  [] Aspirin (Hold for 5 days)     [] Pletal (Hold for 2 days)  [] Pradaxa (Hold for 3 days)    [] Effient (Hold for 7 days)  [] Xarelto (Hold for 2 days)    [] Eliquis (Hold for 2 days)  [] Brilinta (Hold for 7 days)    [] Coumadin (Hold for 5 days) - (INR needs to be drawn the day prior to procedure- INR < 2.0)    [] Aggrenox (Hold for 7 days)        [] Patient will stop medication on their own.    [] Blood Thinner Form Faxed for approval to hold.    Provider form faxed to:     Assessment Completed by:  Electronically signed by Wendie Bazzi RN on 1/16/2023 at 12:16 PM

## 2023-01-16 NOTE — PROGRESS NOTES
[address] [place] [address] Physical & Pain Medicine    Office Visit    Patient Name: Irish Henry    MR #: [id number]    Account [de-identified]    : 1965    Age: 56 y.o. Sex: female    Date: 2023    PCP: Haroldo Dasilva    Chief Complaint:   Chief Complaint   Patient presents with    Back Pain     6/10       History of Present Illness: The patient is a 56 y.o. female who presents for 3 month follow up. Patient states that the last several weeks her pain has been getting worse. Patient has been moving and the weather has been fluctuating. Back Pain  This is a chronic problem. The current episode started more than 1 year ago. The problem occurs constantly. The problem has been gradually worsening since onset. The pain is present in the lumbar spine, sacro-iliac and thoracic spine. The quality of the pain is described as shooting and aching (shooting pain on left side). Radiates to: bilateral hips. The symptoms are aggravated by bending, sitting, stress and standing (walking makes left side worse). Pertinent negatives include no numbness or weakness. 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  (May fill 1/15/23) 90 tablet 0    vitamin D (ERGOCALCIFEROL) 1.25 MG (22221 UT) CAPS capsule Take 1 capsule by mouth once a week      methocarbamol (ROBAXIN) 500 MG tablet Take 1 tablet by mouth 3 times daily as needed (muscle spasms) 90 tablet 5    pantoprazole (PROTONIX) 40 MG tablet Take 40 mg by mouth every morning (before breakfast)      escitalopram (LEXAPRO) 20 MG tablet Take 1 tablet by mouth daily  5    methotrexate (RHEUMATREX) 2.5 MG chemo tablet Take 6 tablets by mouth once a week  0    folic acid (FOLVITE) 1 MG tablet Take 1 tablet by mouth daily  0    ENBREL SURECLICK 50 MG/ML SOAJ Inject 50 mg into the skin once a week  0     No current facility-administered medications for this encounter. Social History    Social History     Socioeconomic History    Marital status:      Spouse name: None    Number of children: 1    Years of education: None    Highest education level: None   Tobacco Use    Smoking status: Former     Packs/day: 1.00     Types: Cigarettes     Quit date: 7/12/2019     Years since quitting: 3.5    Smokeless tobacco: Never   Vaping Use    Vaping Use: Former   Substance and Sexual Activity    Alcohol use: No    Drug use: No         Family History  family history is not on file. Review of Systems:  Review of Systems   Constitutional:  Negative for activity change. Gastrointestinal:  Negative for constipation. Musculoskeletal:  Positive for arthralgias, back pain, gait problem and myalgias. Negative for neck pain. Neurological:  Negative for weakness and numbness. Psychiatric/Behavioral:  Negative for agitation, self-injury and suicidal ideas. The patient is not nervous/anxious. 14 point ROS negative besides that noted in HPI    Physical exam:     Patient Vitals for the past 24 hrs:   BP Temp Temp src Pulse Resp SpO2 Height Weight   01/16/23 1131 138/84 97.6 °F (36.4 °C) Temporal 67 16 94 % 5' 4\" (1.626 m) 146 lb (66.2 kg)         Body mass index is 25.06 kg/m². Physical Exam  Vitals and nursing note reviewed.    Constitutional:       General: She is not in acute distress.     Appearance: She is well-developed.   HENT:      Head: Normocephalic.      Right Ear: External ear normal.      Left Ear: External ear normal.      Nose: Nose normal.   Eyes:      Conjunctiva/sclera: Conjunctivae normal.      Pupils: Pupils are equal, round, and reactive to light.   Neck:      Vascular: No JVD.      Trachea: No tracheal deviation.   Pulmonary:      Effort: Pulmonary effort is normal.   Abdominal:      General: There is no distension.      Tenderness: There is no abdominal tenderness.   Musculoskeletal:      Thoracic back: Spasms and tenderness present.      Lumbar back: Spasms and tenderness (Bilateral SI joint TTP) present. Decreased range of motion. Negative right straight leg raise test and negative left straight leg raise test.   Skin:     General: Skin is warm and dry.   Neurological:      Mental Status: She is alert and oriented to person, place, and time.      Cranial Nerves: No cranial nerve deficit.   Psychiatric:         Behavior: Behavior normal.         Thought Content: Thought content normal.         Judgment: Judgment normal.         LABS:     No results found for: NA, K, CL, CO2, BUN, CREATININE, GLUCOSE, CALCIUM     No results found for: WBC, HGB, HCT, MCV, PLT    Assessment:                                                                                                                                        Active Problems:    Chronic bilateral low back pain with bilateral sciatica    Sacroiliitis (HCC)    Arthritis    Trochanteric bursitis of left hip    Chronic myofascial pain    Piriformis syndrome of both sides    Pain management contract agreement  Resolved Problems:    * No resolved hospital problems. *      PLAN:  Chronic bilateral low back pain with bilateral sciatica  - HYDROcodone-acetaminophen (NORCO) 7.5-325 MG per tablet; Take 1 tablet by mouth every 8 hours as needed for Pain for up to 30 days.  Dispense: 90 tablet; Refill: 0    Refill is at patients  pharmacy to be picked up. Patient instructed that she could take this prescription 4 times per day as needed due to increased pain. That would put her refill due to 2/6/2022. Early refill is okay per NP     Piriformis syndrome of both sides  Chronic myofascial pain  No refill due today  - methocarbamol (ROBAXIN) 500 MG tablet; Take 1 tablet by mouth 3 times daily as needed (muscle spasms)  Dispense: 90 tablet; Refill: 5     Patient to continue daily exercises focusing on core muscles as tolerated due to flare up of pain. [x] Follow up    [] 4 weeks   [] 6-8 weeks   [] 10-12 weeks   [x] 3 months  [] Post procedure to evaluate effectiveness of treatment  [] To evaluate medications changes made at office visit. [] To review diagnostics ordered at last visit. [] To evaluate response to therapy    [x] For management of controlled substance  [x] Random UDS as indicated by ORT score or if indicated by abberent behaviors    Discussion: Discussed exam findings and plan of care with patient. Patient agreed with POC and questions were asked and answered. Activity: discussed exercise as beneficial to pain reduction, encouraged stretching exercise with a focus on torso strengthening. Goals:  Pain Management Goals of Therapy:   [] Resolution in pain  [x] Decrease in pain level  [x] Improvement in ADL's  [x] Increase in activities with less pain  [] Decrease in medication      Controlled substance monitoring:    [x] Discussed medication side effects, risk of tolerance and/or dependence, and/or alternative treatment  [] Discussed the detrimental effects of long term narcotic use in younger patients especially women of childbearing years.   [x] No signs and symptoms of potential drug abuse or diversion were identified  [] Signs of potential drug abuse or diversion were identified   [] ORT Score   [] UDS non-compliant   [] See Note  [] Random urine drug screen sent today   [x] Random urine drug screen not completed today [] Deferred New Patient  [x] Compliant  5/19/2022  [] Not Compliant see note  [] Medication agreement with provider signed today 5/19/2022  [x] Medication agreement with provider on file under media   [x] Medication regimen effective with c/o of side effects noted and continued   [] New patient continuing current medication while developing POC   [] On going assessment and evaluation of medication regimen  [] Medication regimen not effective see plan for changes  [x] Becca Heard reviewed & on file under media     CC:  EDU Hart - CNP, 1/16/2023 at 12:07 PM    EMR dragon/transcription disclaimer: Much of this encounter note is electronic transcription/translation of spoken language to printed tach. Electronic translation of spoken language may be erroneous, or at times, nonsensical words or phrases may be inadvertently transcribed.  Although, I have reviewed the note for such errors, some may still exist.

## 2023-02-07 DIAGNOSIS — M54.42 CHRONIC BILATERAL LOW BACK PAIN WITH BILATERAL SCIATICA: ICD-10-CM

## 2023-02-07 DIAGNOSIS — G89.29 CHRONIC BILATERAL LOW BACK PAIN WITH BILATERAL SCIATICA: ICD-10-CM

## 2023-02-07 DIAGNOSIS — M54.41 CHRONIC BILATERAL LOW BACK PAIN WITH BILATERAL SCIATICA: ICD-10-CM

## 2023-02-07 RX ORDER — HYDROCODONE BITARTRATE AND ACETAMINOPHEN 7.5; 325 MG/1; MG/1
1 TABLET ORAL EVERY 8 HOURS PRN
Qty: 90 TABLET | Refills: 0 | Status: SHIPPED | OUTPATIENT
Start: 2023-02-15 | End: 2023-03-17

## 2023-03-13 DIAGNOSIS — M54.41 CHRONIC BILATERAL LOW BACK PAIN WITH BILATERAL SCIATICA: ICD-10-CM

## 2023-03-13 DIAGNOSIS — M54.42 CHRONIC BILATERAL LOW BACK PAIN WITH BILATERAL SCIATICA: ICD-10-CM

## 2023-03-13 DIAGNOSIS — G89.29 CHRONIC BILATERAL LOW BACK PAIN WITH BILATERAL SCIATICA: ICD-10-CM

## 2023-03-15 RX ORDER — HYDROCODONE BITARTRATE AND ACETAMINOPHEN 7.5; 325 MG/1; MG/1
1 TABLET ORAL EVERY 8 HOURS PRN
Qty: 90 TABLET | Refills: 0 | Status: SHIPPED | OUTPATIENT
Start: 2023-03-17 | End: 2023-04-16

## 2023-04-13 DIAGNOSIS — G89.29 CHRONIC BILATERAL LOW BACK PAIN WITH BILATERAL SCIATICA: ICD-10-CM

## 2023-04-13 DIAGNOSIS — M54.41 CHRONIC BILATERAL LOW BACK PAIN WITH BILATERAL SCIATICA: ICD-10-CM

## 2023-04-13 DIAGNOSIS — M54.42 CHRONIC BILATERAL LOW BACK PAIN WITH BILATERAL SCIATICA: ICD-10-CM

## 2023-04-17 RX ORDER — HYDROCODONE BITARTRATE AND ACETAMINOPHEN 7.5; 325 MG/1; MG/1
1 TABLET ORAL EVERY 8 HOURS PRN
Qty: 90 TABLET | Refills: 0 | Status: SHIPPED | OUTPATIENT
Start: 2023-04-17 | End: 2023-05-17

## 2023-05-10 DIAGNOSIS — G89.29 CHRONIC BILATERAL LOW BACK PAIN WITH BILATERAL SCIATICA: ICD-10-CM

## 2023-05-10 DIAGNOSIS — M54.42 CHRONIC BILATERAL LOW BACK PAIN WITH BILATERAL SCIATICA: ICD-10-CM

## 2023-05-10 DIAGNOSIS — M54.41 CHRONIC BILATERAL LOW BACK PAIN WITH BILATERAL SCIATICA: ICD-10-CM

## 2023-05-10 RX ORDER — HYDROCODONE BITARTRATE AND ACETAMINOPHEN 7.5; 325 MG/1; MG/1
1 TABLET ORAL EVERY 8 HOURS PRN
Qty: 90 TABLET | Refills: 0 | Status: SHIPPED | OUTPATIENT
Start: 2023-05-17 | End: 2023-06-16

## 2023-06-06 ENCOUNTER — LAB (OUTPATIENT)
Dept: LAB | Facility: HOSPITAL | Age: 58
End: 2023-06-06
Payer: MEDICARE

## 2023-06-06 ENCOUNTER — TRANSCRIBE ORDERS (OUTPATIENT)
Dept: LAB | Facility: HOSPITAL | Age: 58
End: 2023-06-06
Payer: MEDICARE

## 2023-06-06 DIAGNOSIS — Z79.899 ENCOUNTER FOR LONG-TERM (CURRENT) USE OF OTHER MEDICATIONS: Primary | ICD-10-CM

## 2023-06-06 DIAGNOSIS — L40.59 POLYARTICULAR PSORIATIC ARTHRITIS: ICD-10-CM

## 2023-06-06 LAB
ALBUMIN SERPL-MCNC: 4.4 G/DL (ref 3.5–5.2)
ALBUMIN/GLOB SERPL: 1.8 G/DL
ALP SERPL-CCNC: 70 U/L (ref 39–117)
ALT SERPL W P-5'-P-CCNC: 12 U/L (ref 1–33)
ANION GAP SERPL CALCULATED.3IONS-SCNC: 7 MMOL/L (ref 5–15)
AST SERPL-CCNC: 15 U/L (ref 1–32)
BASOPHILS # BLD AUTO: 0.09 10*3/MM3 (ref 0–0.2)
BASOPHILS NFR BLD AUTO: 1.1 % (ref 0–1.5)
BILIRUB SERPL-MCNC: 0.4 MG/DL (ref 0–1.2)
BUN SERPL-MCNC: 14 MG/DL (ref 6–20)
BUN/CREAT SERPL: 15.7 (ref 7–25)
CALCIUM SPEC-SCNC: 8.9 MG/DL (ref 8.6–10.5)
CHLORIDE SERPL-SCNC: 106 MMOL/L (ref 98–107)
CO2 SERPL-SCNC: 26 MMOL/L (ref 22–29)
CREAT SERPL-MCNC: 0.89 MG/DL (ref 0.57–1)
DEPRECATED RDW RBC AUTO: 44.7 FL (ref 37–54)
EGFRCR SERPLBLD CKD-EPI 2021: 76.7 ML/MIN/1.73
EOSINOPHIL # BLD AUTO: 0.16 10*3/MM3 (ref 0–0.4)
EOSINOPHIL NFR BLD AUTO: 2 % (ref 0.3–6.2)
ERYTHROCYTE [DISTWIDTH] IN BLOOD BY AUTOMATED COUNT: 14.4 % (ref 12.3–15.4)
ERYTHROCYTE [SEDIMENTATION RATE] IN BLOOD: 10 MM/HR (ref 0–30)
GLOBULIN UR ELPH-MCNC: 2.4 GM/DL
GLUCOSE SERPL-MCNC: 74 MG/DL (ref 65–99)
HCT VFR BLD AUTO: 42.4 % (ref 34–46.6)
HGB BLD-MCNC: 14.2 G/DL (ref 12–15.9)
IMM GRANULOCYTES # BLD AUTO: 0.02 10*3/MM3 (ref 0–0.05)
IMM GRANULOCYTES NFR BLD AUTO: 0.2 % (ref 0–0.5)
LYMPHOCYTES # BLD AUTO: 2.88 10*3/MM3 (ref 0.7–3.1)
LYMPHOCYTES NFR BLD AUTO: 35.8 % (ref 19.6–45.3)
MCH RBC QN AUTO: 28.8 PG (ref 26.6–33)
MCHC RBC AUTO-ENTMCNC: 33.5 G/DL (ref 31.5–35.7)
MCV RBC AUTO: 86 FL (ref 79–97)
MONOCYTES # BLD AUTO: 0.43 10*3/MM3 (ref 0.1–0.9)
MONOCYTES NFR BLD AUTO: 5.3 % (ref 5–12)
NEUTROPHILS NFR BLD AUTO: 4.46 10*3/MM3 (ref 1.7–7)
NEUTROPHILS NFR BLD AUTO: 55.6 % (ref 42.7–76)
NRBC BLD AUTO-RTO: 0 /100 WBC (ref 0–0.2)
PLATELET # BLD AUTO: 295 10*3/MM3 (ref 140–450)
PMV BLD AUTO: 10.3 FL (ref 6–12)
POTASSIUM SERPL-SCNC: 4.6 MMOL/L (ref 3.5–5.2)
PROT SERPL-MCNC: 6.8 G/DL (ref 6–8.5)
RBC # BLD AUTO: 4.93 10*6/MM3 (ref 3.77–5.28)
SODIUM SERPL-SCNC: 139 MMOL/L (ref 136–145)
WBC NRBC COR # BLD: 8.04 10*3/MM3 (ref 3.4–10.8)

## 2023-06-06 PROCEDURE — 85025 COMPLETE CBC W/AUTO DIFF WBC: CPT | Performed by: INTERNAL MEDICINE

## 2023-06-06 PROCEDURE — 85652 RBC SED RATE AUTOMATED: CPT | Performed by: INTERNAL MEDICINE

## 2023-06-06 PROCEDURE — 80053 COMPREHEN METABOLIC PANEL: CPT | Performed by: INTERNAL MEDICINE

## 2023-06-06 PROCEDURE — 36415 COLL VENOUS BLD VENIPUNCTURE: CPT | Performed by: INTERNAL MEDICINE

## 2023-06-27 ENCOUNTER — HOSPITAL ENCOUNTER (OUTPATIENT)
Dept: PAIN MANAGEMENT | Age: 58
Discharge: HOME | End: 2023-06-27
Payer: MEDICARE

## 2023-06-27 VITALS
RESPIRATION RATE: 16 BRPM | TEMPERATURE: 97.6 F | HEART RATE: 61 BPM | OXYGEN SATURATION: 96 % | HEIGHT: 64 IN | SYSTOLIC BLOOD PRESSURE: 147 MMHG | DIASTOLIC BLOOD PRESSURE: 93 MMHG | WEIGHT: 147 LBS | BODY MASS INDEX: 25.1 KG/M2

## 2023-06-27 DIAGNOSIS — Z51.81 ENCOUNTER FOR MONITORING OPIOID MAINTENANCE THERAPY: ICD-10-CM

## 2023-06-27 DIAGNOSIS — Z79.891 ENCOUNTER FOR MONITORING OPIOID MAINTENANCE THERAPY: ICD-10-CM

## 2023-06-27 DIAGNOSIS — M54.42 CHRONIC BILATERAL LOW BACK PAIN WITH BILATERAL SCIATICA: ICD-10-CM

## 2023-06-27 DIAGNOSIS — M54.41 CHRONIC BILATERAL LOW BACK PAIN WITH BILATERAL SCIATICA: ICD-10-CM

## 2023-06-27 DIAGNOSIS — G89.29 CHRONIC BILATERAL LOW BACK PAIN WITH BILATERAL SCIATICA: ICD-10-CM

## 2023-06-27 PROCEDURE — 99213 OFFICE O/P EST LOW 20 MIN: CPT | Performed by: NURSE PRACTITIONER

## 2023-06-27 PROCEDURE — 99215 OFFICE O/P EST HI 40 MIN: CPT

## 2023-06-27 RX ORDER — HYDROCODONE BITARTRATE AND ACETAMINOPHEN 7.5; 325 MG/1; MG/1
1 TABLET ORAL EVERY 8 HOURS PRN
Qty: 90 TABLET | Refills: 0 | Status: SHIPPED | OUTPATIENT
Start: 2023-07-15 | End: 2023-08-14

## 2023-06-27 ASSESSMENT — PAIN DESCRIPTION - ORIENTATION: ORIENTATION: LOWER

## 2023-06-27 ASSESSMENT — ENCOUNTER SYMPTOMS
BACK PAIN: 1
CONSTIPATION: 0

## 2023-06-27 ASSESSMENT — PAIN DESCRIPTION - LOCATION: LOCATION: BACK

## 2023-06-27 ASSESSMENT — PAIN SCALES - GENERAL: PAINLEVEL_OUTOF10: 4

## 2023-06-27 ASSESSMENT — PAIN DESCRIPTION - PAIN TYPE: TYPE: CHRONIC PAIN

## 2023-08-14 DIAGNOSIS — G89.29 CHRONIC BILATERAL LOW BACK PAIN WITH BILATERAL SCIATICA: ICD-10-CM

## 2023-08-14 DIAGNOSIS — M54.41 CHRONIC BILATERAL LOW BACK PAIN WITH BILATERAL SCIATICA: ICD-10-CM

## 2023-08-14 DIAGNOSIS — M54.42 CHRONIC BILATERAL LOW BACK PAIN WITH BILATERAL SCIATICA: ICD-10-CM

## 2023-08-15 RX ORDER — HYDROCODONE BITARTRATE AND ACETAMINOPHEN 7.5; 325 MG/1; MG/1
1 TABLET ORAL EVERY 8 HOURS PRN
Qty: 90 TABLET | Refills: 0 | Status: SHIPPED | OUTPATIENT
Start: 2023-08-15 | End: 2023-09-14

## 2023-08-21 DIAGNOSIS — M54.41 CHRONIC BILATERAL LOW BACK PAIN WITH BILATERAL SCIATICA: ICD-10-CM

## 2023-08-21 DIAGNOSIS — M54.42 CHRONIC BILATERAL LOW BACK PAIN WITH BILATERAL SCIATICA: ICD-10-CM

## 2023-08-21 DIAGNOSIS — G89.29 CHRONIC BILATERAL LOW BACK PAIN WITH BILATERAL SCIATICA: ICD-10-CM

## 2023-09-11 DIAGNOSIS — G89.29 CHRONIC BILATERAL LOW BACK PAIN WITH BILATERAL SCIATICA: ICD-10-CM

## 2023-09-11 DIAGNOSIS — M54.41 CHRONIC BILATERAL LOW BACK PAIN WITH BILATERAL SCIATICA: ICD-10-CM

## 2023-09-11 DIAGNOSIS — M54.42 CHRONIC BILATERAL LOW BACK PAIN WITH BILATERAL SCIATICA: ICD-10-CM

## 2023-09-11 RX ORDER — HYDROCODONE BITARTRATE AND ACETAMINOPHEN 7.5; 325 MG/1; MG/1
1 TABLET ORAL EVERY 8 HOURS PRN
Qty: 90 TABLET | Refills: 0 | Status: SHIPPED | OUTPATIENT
Start: 2023-09-15 | End: 2023-10-15

## 2023-09-21 ENCOUNTER — HOSPITAL ENCOUNTER (OUTPATIENT)
Dept: PAIN MANAGEMENT | Age: 58
Discharge: HOME | End: 2023-09-21
Payer: MEDICARE

## 2023-09-21 VITALS
WEIGHT: 151.01 LBS | SYSTOLIC BLOOD PRESSURE: 134 MMHG | HEART RATE: 61 BPM | OXYGEN SATURATION: 98 % | DIASTOLIC BLOOD PRESSURE: 94 MMHG | HEIGHT: 64 IN | RESPIRATION RATE: 18 BRPM | TEMPERATURE: 97.9 F | BODY MASS INDEX: 25.78 KG/M2

## 2023-09-21 DIAGNOSIS — M79.18 CHRONIC MYOFASCIAL PAIN: ICD-10-CM

## 2023-09-21 DIAGNOSIS — Z79.891 ENCOUNTER FOR MONITORING OPIOID MAINTENANCE THERAPY: ICD-10-CM

## 2023-09-21 DIAGNOSIS — G89.29 CHRONIC MYOFASCIAL PAIN: ICD-10-CM

## 2023-09-21 DIAGNOSIS — M54.42 CHRONIC BILATERAL LOW BACK PAIN WITH BILATERAL SCIATICA: Primary | ICD-10-CM

## 2023-09-21 DIAGNOSIS — Z02.89 PAIN MANAGEMENT CONTRACT AGREEMENT: ICD-10-CM

## 2023-09-21 DIAGNOSIS — G89.29 CHRONIC BILATERAL LOW BACK PAIN WITH BILATERAL SCIATICA: Primary | ICD-10-CM

## 2023-09-21 DIAGNOSIS — M54.41 CHRONIC BILATERAL LOW BACK PAIN WITH BILATERAL SCIATICA: Primary | ICD-10-CM

## 2023-09-21 DIAGNOSIS — Z51.81 ENCOUNTER FOR MONITORING OPIOID MAINTENANCE THERAPY: ICD-10-CM

## 2023-09-21 PROCEDURE — 99213 OFFICE O/P EST LOW 20 MIN: CPT

## 2023-09-21 ASSESSMENT — PAIN DESCRIPTION - PAIN TYPE: TYPE: CHRONIC PAIN

## 2023-09-21 ASSESSMENT — PAIN DESCRIPTION - ORIENTATION: ORIENTATION: LOWER

## 2023-09-21 ASSESSMENT — PAIN DESCRIPTION - LOCATION: LOCATION: BACK

## 2023-09-21 ASSESSMENT — PAIN SCALES - GENERAL: PAINLEVEL_OUTOF10: 5

## 2023-09-22 ASSESSMENT — ENCOUNTER SYMPTOMS: CONSTIPATION: 0

## 2023-09-22 NOTE — PROGRESS NOTES
Clinic Documentation      Education Provided:  [x] Review of Omar Velasquez  [] Agreement Review  [x] PEG Score Calculated [] PHQ Score Calculated [] ORT Score Calculated    [] Compliance Issues Discussed [] Cognitive Behavior Needs [x] Exercise [] Review of Test [] Financial Issues  [x] Tobacco/Alcohol Use Reviewed [x] Teaching [] New Patient [] Picture Obtained    Physician Plan:  [] Outgoing Referral  [] Pharmacy Consult  [] Test Ordered [x] Prescription Ordered/Changed   [] Obtained Test Results / Consult Notes        Complete if patient is withholding blood thinner for procedure     Blood Thinner Patient is currently taking:      [] Plavix (Hold for 7 days)  [] Aspirin (Hold for 5 days)     [] Pletal (Hold for 2 days)  [] Pradaxa (Hold for 3 days)    [] Effient (Hold for 7 days)  [] Xarelto (Hold for 2 days)    [] Eliquis (Hold for 2 days)  [] Brilinta (Hold for 7 days)    [] Coumadin (Hold for 5 days) - (INR needs to be drawn the day prior to procedure- INR < 2.0)    [] Aggrenox (Hold for 7 days)        [] Patient will stop medication on their own.    [] Blood Thinner Form Faxed for approval to hold.    Provider form faxed to:     Assessment Completed by:  Electronically signed by Nhan Coronado RN on 9/21/2023 at 9:52 AM
yes; refill sent to medical director at appointment NA, see refill encounter dated 9/24/2023     Piriformis syndrome of both sides  Chronic myofascial pain  No refill due today  - methocarbamol (ROBAXIN) 500 MG tablet; Take 1 tablet by mouth 3 times daily as needed (muscle spasms)  Dispense: 90 tablet; Refill: 5     Encounter for Opioid Maintenance Monitoring  Patient is tested according to risk of opioid misuse, office policy, state regulations and/or providers discretion  Patient risk low  UDS or Saliva collected & tested on 9/21/2023      Patient to continue daily exercises focusing on core muscles as tolerated due to flare up of pain. [x] Follow up    [] 4 weeks   [] 6-8 weeks   [x] 10-12 weeks   [] 3 months  [] Post procedure to evaluate effectiveness of treatment  [] To evaluate medications changes made at office visit. [] To review diagnostics ordered at last visit. [] To evaluate response to therapy    [x] For management of controlled substance  [x] Random UDS as indicated by ORT score or if indicated by abberent behaviors    Discussion: Discussed exam findings and plan of care with patient. Patient agreed with POC and questions were asked and answered. Activity: discussed exercise as beneficial to pain reduction, encouraged stretching exercise with a focus on torso strengthening. Goals:  Pain Management Goals of Therapy:   [] Resolution in pain  [x] Decrease in pain level  [x] Improvement in ADL's  [x] Increase in activities with less pain  [] Decrease in medication      Controlled substance monitoring:    [x] Discussed medication side effects, risk of tolerance and/or dependence, and/or alternative treatment  [] Discussed the detrimental effects of long term narcotic use in younger patients especially women of childbearing years.   [x] No signs and symptoms of potential drug abuse or diversion were identified  [] Signs of potential drug abuse or diversion were identified   [] ORT Score   []

## 2023-09-24 DIAGNOSIS — G89.29 CHRONIC BILATERAL LOW BACK PAIN WITH BILATERAL SCIATICA: ICD-10-CM

## 2023-09-24 DIAGNOSIS — M54.42 CHRONIC BILATERAL LOW BACK PAIN WITH BILATERAL SCIATICA: ICD-10-CM

## 2023-09-24 DIAGNOSIS — M54.41 CHRONIC BILATERAL LOW BACK PAIN WITH BILATERAL SCIATICA: ICD-10-CM

## 2023-09-24 RX ORDER — HYDROCODONE BITARTRATE AND ACETAMINOPHEN 7.5; 325 MG/1; MG/1
1 TABLET ORAL EVERY 8 HOURS PRN
Qty: 90 TABLET | Refills: 0 | Status: SHIPPED | OUTPATIENT
Start: 2023-10-13 | End: 2023-11-12

## 2023-09-24 ASSESSMENT — ENCOUNTER SYMPTOMS: BACK PAIN: 1

## 2023-10-04 RX ORDER — HYDROCODONE BITARTRATE AND ACETAMINOPHEN 7.5; 325 MG/1; MG/1
TABLET ORAL
Qty: 90 TABLET | Refills: 0 | OUTPATIENT
Start: 2023-10-04

## 2023-10-13 ENCOUNTER — TELEPHONE (OUTPATIENT)
Dept: PAIN MANAGEMENT | Age: 58
End: 2023-10-13

## 2023-11-08 DIAGNOSIS — M54.42 CHRONIC BILATERAL LOW BACK PAIN WITH BILATERAL SCIATICA: ICD-10-CM

## 2023-11-08 DIAGNOSIS — G89.29 CHRONIC BILATERAL LOW BACK PAIN WITH BILATERAL SCIATICA: ICD-10-CM

## 2023-11-08 DIAGNOSIS — M54.41 CHRONIC BILATERAL LOW BACK PAIN WITH BILATERAL SCIATICA: ICD-10-CM

## 2023-11-09 RX ORDER — HYDROCODONE BITARTRATE AND ACETAMINOPHEN 7.5; 325 MG/1; MG/1
1 TABLET ORAL EVERY 8 HOURS PRN
Qty: 90 TABLET | Refills: 0 | Status: SHIPPED | OUTPATIENT
Start: 2023-11-14 | End: 2023-12-14

## 2023-12-11 DIAGNOSIS — M54.42 CHRONIC BILATERAL LOW BACK PAIN WITH BILATERAL SCIATICA: ICD-10-CM

## 2023-12-11 DIAGNOSIS — G89.29 CHRONIC BILATERAL LOW BACK PAIN WITH BILATERAL SCIATICA: ICD-10-CM

## 2023-12-11 DIAGNOSIS — M54.41 CHRONIC BILATERAL LOW BACK PAIN WITH BILATERAL SCIATICA: ICD-10-CM

## 2023-12-11 RX ORDER — HYDROCODONE BITARTRATE AND ACETAMINOPHEN 7.5; 325 MG/1; MG/1
1 TABLET ORAL EVERY 8 HOURS PRN
Qty: 90 TABLET | Refills: 0 | Status: SHIPPED | OUTPATIENT
Start: 2023-12-14 | End: 2024-01-13

## 2024-02-12 DIAGNOSIS — G89.29 CHRONIC BILATERAL LOW BACK PAIN WITH BILATERAL SCIATICA: ICD-10-CM

## 2024-02-12 DIAGNOSIS — M54.41 CHRONIC BILATERAL LOW BACK PAIN WITH BILATERAL SCIATICA: ICD-10-CM

## 2024-02-12 DIAGNOSIS — M54.42 CHRONIC BILATERAL LOW BACK PAIN WITH BILATERAL SCIATICA: ICD-10-CM

## 2024-02-12 RX ORDER — HYDROCODONE BITARTRATE AND ACETAMINOPHEN 7.5; 325 MG/1; MG/1
1 TABLET ORAL EVERY 8 HOURS PRN
Qty: 90 TABLET | Refills: 0 | Status: SHIPPED | OUTPATIENT
Start: 2024-02-12 | End: 2024-03-13

## 2024-03-11 DIAGNOSIS — M54.42 CHRONIC BILATERAL LOW BACK PAIN WITH BILATERAL SCIATICA: ICD-10-CM

## 2024-03-11 DIAGNOSIS — G89.29 CHRONIC BILATERAL LOW BACK PAIN WITH BILATERAL SCIATICA: ICD-10-CM

## 2024-03-11 DIAGNOSIS — M54.41 CHRONIC BILATERAL LOW BACK PAIN WITH BILATERAL SCIATICA: ICD-10-CM

## 2024-03-11 RX ORDER — HYDROCODONE BITARTRATE AND ACETAMINOPHEN 7.5; 325 MG/1; MG/1
1 TABLET ORAL EVERY 8 HOURS PRN
Qty: 90 TABLET | Refills: 0 | Status: SHIPPED | OUTPATIENT
Start: 2024-03-14 | End: 2024-04-13

## 2024-03-21 ENCOUNTER — HOSPITAL ENCOUNTER (OUTPATIENT)
Dept: PAIN MANAGEMENT | Age: 59
Discharge: HOME | End: 2024-03-21
Payer: MEDICARE

## 2024-03-21 VITALS
HEART RATE: 59 BPM | BODY MASS INDEX: 27.98 KG/M2 | SYSTOLIC BLOOD PRESSURE: 122 MMHG | DIASTOLIC BLOOD PRESSURE: 83 MMHG | RESPIRATION RATE: 18 BRPM | TEMPERATURE: 98.3 F | OXYGEN SATURATION: 98 % | WEIGHT: 163 LBS

## 2024-03-21 DIAGNOSIS — M79.18 CHRONIC MYOFASCIAL PAIN: ICD-10-CM

## 2024-03-21 DIAGNOSIS — G89.29 CHRONIC MYOFASCIAL PAIN: ICD-10-CM

## 2024-03-21 DIAGNOSIS — M19.90 ARTHRITIS: ICD-10-CM

## 2024-03-21 DIAGNOSIS — G57.03 PIRIFORMIS SYNDROME OF BOTH SIDES: ICD-10-CM

## 2024-03-21 DIAGNOSIS — G89.29 CHRONIC BILATERAL LOW BACK PAIN WITH BILATERAL SCIATICA: ICD-10-CM

## 2024-03-21 DIAGNOSIS — M54.42 CHRONIC BILATERAL LOW BACK PAIN WITH BILATERAL SCIATICA: ICD-10-CM

## 2024-03-21 DIAGNOSIS — M54.42 CHRONIC BILATERAL LOW BACK PAIN WITH BILATERAL SCIATICA: Primary | ICD-10-CM

## 2024-03-21 DIAGNOSIS — M54.41 CHRONIC BILATERAL LOW BACK PAIN WITH BILATERAL SCIATICA: ICD-10-CM

## 2024-03-21 DIAGNOSIS — G89.29 CHRONIC BILATERAL LOW BACK PAIN WITH BILATERAL SCIATICA: Primary | ICD-10-CM

## 2024-03-21 DIAGNOSIS — M54.41 CHRONIC BILATERAL LOW BACK PAIN WITH BILATERAL SCIATICA: Primary | ICD-10-CM

## 2024-03-21 PROCEDURE — 99213 OFFICE O/P EST LOW 20 MIN: CPT

## 2024-03-21 RX ORDER — HYDROCODONE BITARTRATE AND ACETAMINOPHEN 7.5; 325 MG/1; MG/1
1 TABLET ORAL EVERY 8 HOURS PRN
Qty: 90 TABLET | Refills: 0 | Status: SHIPPED | OUTPATIENT
Start: 2024-04-13 | End: 2024-05-13

## 2024-03-21 RX ORDER — METHOCARBAMOL 500 MG/1
500 TABLET, FILM COATED ORAL 3 TIMES DAILY PRN
Qty: 90 TABLET | Refills: 2 | Status: SHIPPED | OUTPATIENT
Start: 2024-03-21

## 2024-03-21 ASSESSMENT — ENCOUNTER SYMPTOMS
CONSTIPATION: 0
BACK PAIN: 1

## 2024-03-21 ASSESSMENT — PAIN DESCRIPTION - PAIN TYPE: TYPE: CHRONIC PAIN

## 2024-03-21 ASSESSMENT — PAIN DESCRIPTION - ORIENTATION: ORIENTATION: LOWER

## 2024-03-21 ASSESSMENT — PAIN DESCRIPTION - LOCATION: LOCATION: BACK

## 2024-03-21 ASSESSMENT — PAIN SCALES - GENERAL: PAINLEVEL_OUTOF10: 5

## 2024-03-21 NOTE — PROGRESS NOTES
Clinic Documentation      Education Provided:  [x] Review of Mike  [] Agreement Review  [x] PEG Score Calculated [] PHQ Score Calculated [] ORT Score Calculated    [] Compliance Issues Discussed [] Cognitive Behavior Needs [x] Exercise [] Review of Test [] Financial Issues  [x] Tobacco/Alcohol Use Reviewed [x] Teaching [] New Patient [] Picture Obtained    Physician Plan:  [] Outgoing Referral  [] Pharmacy Consult  [] Test Ordered [x] Prescription Ordered/Changed   [] Obtained Test Results / Consult Notes        Complete if patient is withholding blood thinner for procedure     Blood Thinner Patient is currently taking:      [] Plavix (Hold for 7 days)  [] Aspirin (Hold for 5 days)     [] Pletal (Hold for 2 days)  [] Pradaxa (Hold for 3 days)    [] Effient (Hold for 7 days)  [] Xarelto (Hold for 2 days)    [] Eliquis (Hold for 2 days)  [] Brilinta (Hold for 7 days)    [] Coumadin (Hold for 5 days) - (INR needs to be drawn the day prior to procedure- INR < 2.0)    [] Aggrenox (Hold for 7 days)        [] Patient will stop medication on their own.    [] Blood Thinner Form Faxed for approval to hold.   Provider form faxed to:     Assessment Completed by:  Electronically signed by Sylvia Blanc RN on 3/21/2024 at 9:33 AM  
patient is not nervous/anxious.    All other systems reviewed and are negative.       14 point ROS negative besides that noted in HPI    Physical exam:     Patient Vitals for the past 24 hrs:   BP Temp Temp src Pulse Resp SpO2 Weight   03/21/24 0915 122/83 98.3 °F (36.8 °C) Temporal 59 18 98 % 73.9 kg (163 lb)       Body mass index is 27.98 kg/m².    Physical Exam  Vitals and nursing note reviewed.   Constitutional:       General: She is not in acute distress.     Appearance: Normal appearance. She is well-developed.   HENT:      Head: Normocephalic.      Right Ear: External ear normal.      Left Ear: External ear normal.      Nose: Nose normal.   Eyes:      Conjunctiva/sclera: Conjunctivae normal.      Pupils: Pupils are equal, round, and reactive to light.   Neck:      Vascular: No JVD.      Trachea: No tracheal deviation.   Cardiovascular:      Rate and Rhythm: Normal rate.   Pulmonary:      Effort: Pulmonary effort is normal.   Abdominal:      General: There is no distension.      Tenderness: There is no abdominal tenderness.   Musculoskeletal:         General: Tenderness present.      Cervical back: Neck supple.      Thoracic back: Spasms and tenderness present.      Lumbar back: Spasms and tenderness (Bilateral SI joint TTP) present. Decreased range of motion. Negative right straight leg raise test and negative left straight leg raise test.   Skin:     General: Skin is warm and dry.   Neurological:      Mental Status: She is alert and oriented to person, place, and time.      Cranial Nerves: No cranial nerve deficit.   Psychiatric:         Mood and Affect: Mood normal.         Behavior: Behavior normal.         Thought Content: Thought content normal.         Judgment: Judgment normal.           LABS:     No results found for: \"NA\", \"K\", \"CL\", \"CO2\", \"BUN\", \"CREATININE\", \"GLUCOSE\", \"CALCIUM\"     No results found for: \"WBC\", \"HGB\", \"HCT\", \"MCV\", \"PLT\"    Assessment:

## 2024-03-21 NOTE — TELEPHONE ENCOUNTER
1. Chronic bilateral low back pain with bilateral sciatica    2. Piriformis syndrome of both sides    3. Chronic myofascial pain      Requested Prescriptions     Pending Prescriptions Disp Refills    HYDROcodone-acetaminophen (NORCO) 7.5-325 MG per tablet 90 tablet 0     Sig: Take 1 tablet by mouth every 8 hours as needed for Pain for up to 30 days. (May fill 4/13/24)    methocarbamol (ROBAXIN) 500 MG tablet 90 tablet 2     Sig: Take 1 tablet by mouth 3 times daily as needed (muscle spasms)       Continue medication with refill sent at appointment yes; refill sent to medical director at appointment no, see refill encounter dated 3/21/2024    Electronically signed by EDU Abraham CNP on 3/21/2024 at 9:40 AM

## 2024-04-10 ENCOUNTER — TELEPHONE (OUTPATIENT)
Dept: PAIN MANAGEMENT | Age: 59
End: 2024-04-10

## 2024-05-07 DIAGNOSIS — M54.41 CHRONIC BILATERAL LOW BACK PAIN WITH BILATERAL SCIATICA: ICD-10-CM

## 2024-05-07 DIAGNOSIS — G89.29 CHRONIC BILATERAL LOW BACK PAIN WITH BILATERAL SCIATICA: ICD-10-CM

## 2024-05-07 DIAGNOSIS — M54.42 CHRONIC BILATERAL LOW BACK PAIN WITH BILATERAL SCIATICA: ICD-10-CM

## 2024-05-09 RX ORDER — HYDROCODONE BITARTRATE AND ACETAMINOPHEN 7.5; 325 MG/1; MG/1
1 TABLET ORAL EVERY 8 HOURS PRN
Qty: 90 TABLET | Refills: 0 | Status: SHIPPED | OUTPATIENT
Start: 2024-05-13 | End: 2024-06-12

## 2024-06-11 DIAGNOSIS — G89.29 CHRONIC BILATERAL LOW BACK PAIN WITH BILATERAL SCIATICA: ICD-10-CM

## 2024-06-11 DIAGNOSIS — M54.41 CHRONIC BILATERAL LOW BACK PAIN WITH BILATERAL SCIATICA: ICD-10-CM

## 2024-06-11 DIAGNOSIS — M54.42 CHRONIC BILATERAL LOW BACK PAIN WITH BILATERAL SCIATICA: ICD-10-CM

## 2024-06-11 RX ORDER — HYDROCODONE BITARTRATE AND ACETAMINOPHEN 7.5; 325 MG/1; MG/1
1 TABLET ORAL EVERY 8 HOURS PRN
Qty: 90 TABLET | Refills: 0 | Status: SHIPPED | OUTPATIENT
Start: 2024-06-12 | End: 2024-07-12

## 2024-06-24 ENCOUNTER — HOSPITAL ENCOUNTER (OUTPATIENT)
Dept: GENERAL RADIOLOGY | Age: 59
Discharge: HOME | End: 2024-06-24
Payer: MEDICARE

## 2024-06-24 ENCOUNTER — HOSPITAL ENCOUNTER (OUTPATIENT)
Dept: PAIN MANAGEMENT | Age: 59
Discharge: HOME | End: 2024-06-24
Payer: MEDICARE

## 2024-06-24 VITALS
HEART RATE: 58 BPM | RESPIRATION RATE: 16 BRPM | HEIGHT: 64 IN | BODY MASS INDEX: 28.17 KG/M2 | TEMPERATURE: 96.3 F | WEIGHT: 165 LBS | SYSTOLIC BLOOD PRESSURE: 122 MMHG | DIASTOLIC BLOOD PRESSURE: 85 MMHG | OXYGEN SATURATION: 98 %

## 2024-06-24 DIAGNOSIS — M54.42 CHRONIC BILATERAL LOW BACK PAIN WITH BILATERAL SCIATICA: ICD-10-CM

## 2024-06-24 DIAGNOSIS — M54.41 CHRONIC BILATERAL LOW BACK PAIN WITH BILATERAL SCIATICA: ICD-10-CM

## 2024-06-24 DIAGNOSIS — M25.562 ACUTE PAIN OF LEFT KNEE: ICD-10-CM

## 2024-06-24 DIAGNOSIS — G89.29 CHRONIC BILATERAL LOW BACK PAIN WITH BILATERAL SCIATICA: ICD-10-CM

## 2024-06-24 DIAGNOSIS — G57.03 PIRIFORMIS SYNDROME OF BOTH SIDES: ICD-10-CM

## 2024-06-24 DIAGNOSIS — M79.18 CHRONIC MYOFASCIAL PAIN: Primary | ICD-10-CM

## 2024-06-24 DIAGNOSIS — M79.18 CHRONIC MYOFASCIAL PAIN: ICD-10-CM

## 2024-06-24 DIAGNOSIS — Z02.89 PAIN MANAGEMENT CONTRACT AGREEMENT: ICD-10-CM

## 2024-06-24 DIAGNOSIS — G89.29 CHRONIC MYOFASCIAL PAIN: Primary | ICD-10-CM

## 2024-06-24 DIAGNOSIS — G89.29 CHRONIC MYOFASCIAL PAIN: ICD-10-CM

## 2024-06-24 PROCEDURE — 73560 X-RAY EXAM OF KNEE 1 OR 2: CPT

## 2024-06-24 PROCEDURE — 99214 OFFICE O/P EST MOD 30 MIN: CPT

## 2024-06-24 PROCEDURE — 99215 OFFICE O/P EST HI 40 MIN: CPT

## 2024-06-24 RX ORDER — METHOCARBAMOL 500 MG/1
500 TABLET, FILM COATED ORAL 3 TIMES DAILY PRN
Qty: 90 TABLET | Refills: 2 | Status: SHIPPED | OUTPATIENT
Start: 2024-06-24

## 2024-06-24 RX ORDER — HYDROCODONE BITARTRATE AND ACETAMINOPHEN 7.5; 325 MG/1; MG/1
1 TABLET ORAL EVERY 8 HOURS PRN
Qty: 90 TABLET | Refills: 0 | Status: SHIPPED | OUTPATIENT
Start: 2024-07-12 | End: 2024-08-11

## 2024-06-24 ASSESSMENT — PAIN SCALES - GENERAL: PAINLEVEL_OUTOF10: 5

## 2024-06-24 ASSESSMENT — PAIN DESCRIPTION - PAIN TYPE: TYPE: CHRONIC PAIN

## 2024-06-24 ASSESSMENT — PAIN DESCRIPTION - LOCATION: LOCATION: BACK

## 2024-06-24 ASSESSMENT — ENCOUNTER SYMPTOMS
CONSTIPATION: 0
BACK PAIN: 1

## 2024-06-24 ASSESSMENT — PAIN DESCRIPTION - ORIENTATION: ORIENTATION: LOWER

## 2024-06-24 NOTE — TELEPHONE ENCOUNTER
1. Chronic bilateral low back pain with bilateral sciatica    2. Piriformis syndrome of both sides    3. Chronic myofascial pain      Requested Prescriptions     Pending Prescriptions Disp Refills    HYDROcodone-acetaminophen (NORCO) 7.5-325 MG per tablet 90 tablet 0     Sig: Take 1 tablet by mouth every 8 hours as needed for Pain for up to 30 days. (May fill 07/12/24)    methocarbamol (ROBAXIN) 500 MG tablet 90 tablet 2     Sig: Take 1 tablet by mouth 3 times daily as needed (muscle spasms)       Continue medication with refill sent at appointment yes; refill sent to medical director at appointment no, see refill encounter dated 6/24/2024    Electronically signed by EDU Abraham CNP on 6/24/2024 at 10:12 AM

## 2024-06-24 NOTE — PROGRESS NOTES
Clinic Documentation      Education Provided:  [x] Review of Mike  [x] Agreement Review  [x] PEG Score Calculated [x] PHQ Score Calculated [x] ORT Score Calculated    [] Compliance Issues Discussed [] Cognitive Behavior Needs [x] Exercise [] Review of Test [] Financial Issues  [x] Tobacco/Alcohol Use Reviewed [x] Teaching [] New Patient [] Picture Obtained    Physician Plan:  [] Outgoing Referral  [] Pharmacy Consult  [] Test Ordered [x] Prescription Ordered/Changed   [] Obtained Test Results / Consult Notes        Complete if patient is withholding blood thinner for procedure     Blood Thinner Patient is currently taking:      [] Plavix (Hold for 7 days)  [] Aspirin (Hold for 5 days)     [] Pletal (Hold for 2 days)  [] Pradaxa (Hold for 3 days)    [] Effient (Hold for 7 days)  [] Xarelto (Hold for 2 days)    [] Eliquis (Hold for 2 days)  [] Brilinta (Hold for 7 days)    [] Coumadin (Hold for 5 days) - (INR needs to be drawn the day prior to procedure- INR < 2.0)    [] Aggrenox (Hold for 7 days)        [] Patient will stop medication on their own.    [] Blood Thinner Form Faxed for approval to hold.   Provider form faxed to:     Assessment Completed by:  Electronically signed by Sara Jacobson MA on 6/24/2024 at 10:24 AM

## 2024-06-24 NOTE — H&P
tablet 90 tablet 0       Sig: Take 1 tablet by mouth every 8 hours as needed for Pain for up to 30 days. (May fill 07/12/24)    methocarbamol (ROBAXIN) 500 MG tablet 90 tablet 2       Sig: Take 1 tablet by mouth 3 times daily as needed (muscle spasms)     - HEP - Patient encouraged to continue patient specific home exercise program at least 3 times a week or as tolerated.      3. Acute pain of left knee  - XR KNEE LEFT (1-2 VIEWS); Future    4. Pain management contract agreement  - Agreement reviewed and signed 6/24/2024  Patient or Patient's Advocate verbalized understanding of agreement    [x] Follow up    [] 4 weeks   [] 6-8 weeks   [] 10-12 weeks   [x] 3 months  [] Post procedure to evaluate effectiveness of treatment  [] To evaluate medications changes made at office visit.   [x] To review diagnostics ordered at last visit.   [] To evaluate response to therapy    [x] For management of controlled substance  [x] Random UDS as indicated by ORT score or if indicated by abberent behaviors    Discussion: Discussed exam findings and plan of care with patient. Patient agreed with POC and questions were asked and answered.     Activity: discussed exercise as beneficial to pain reduction, encouraged stretching exercise with a focus on torso strengthening.    Goals:  Pain Management Goals of Therapy:   [] Resolution in pain  [x] Decrease in pain level  [x] Improvement in ADL's  [x] Increase in activities with less pain  [] Decrease in medication      Controlled substance monitoring:    [x] Discussed medication side effects, risk of tolerance and/or dependence, and/or alternative treatment  [] Discussed the detrimental effects of long term narcotic use in younger patients especially women of childbearing years.  [x] No signs and symptoms of potential drug abuse or diversion were identified  [] Signs of potential drug abuse or diversion were identified   [] ORT Score   [] UDS non-compliant   [] See Note  [] Random urine

## 2024-07-03 ENCOUNTER — TELEPHONE (OUTPATIENT)
Dept: PAIN MANAGEMENT | Age: 59
End: 2024-07-03

## 2024-07-03 NOTE — TELEPHONE ENCOUNTER
Patient left message on nurse line asking if the results of her xrays are available.  Routing to provider at this time.

## 2024-08-07 DIAGNOSIS — M54.42 CHRONIC BILATERAL LOW BACK PAIN WITH BILATERAL SCIATICA: ICD-10-CM

## 2024-08-07 DIAGNOSIS — G89.29 CHRONIC BILATERAL LOW BACK PAIN WITH BILATERAL SCIATICA: ICD-10-CM

## 2024-08-07 DIAGNOSIS — M54.41 CHRONIC BILATERAL LOW BACK PAIN WITH BILATERAL SCIATICA: ICD-10-CM

## 2024-08-07 RX ORDER — HYDROCODONE BITARTRATE AND ACETAMINOPHEN 7.5; 325 MG/1; MG/1
1 TABLET ORAL EVERY 8 HOURS PRN
Qty: 90 TABLET | Refills: 0 | Status: SHIPPED | OUTPATIENT
Start: 2024-08-10 | End: 2024-09-09

## (undated) DEVICE — SINGLE-USE BIOPSY FORCEPS: Brand: RADIAL JAW 4

## (undated) DEVICE — CANN SMPL SOFTECH BIFLO ETCO2 A/M 7FT